# Patient Record
Sex: MALE | Race: BLACK OR AFRICAN AMERICAN | Employment: OTHER | URBAN - METROPOLITAN AREA
[De-identification: names, ages, dates, MRNs, and addresses within clinical notes are randomized per-mention and may not be internally consistent; named-entity substitution may affect disease eponyms.]

---

## 2017-01-05 ENCOUNTER — PATIENT MESSAGE (OUTPATIENT)
Dept: CARDIOLOGY CLINIC | Age: 72
End: 2017-01-05

## 2017-01-05 RX ORDER — METOPROLOL TARTRATE 50 MG/1
50 TABLET, FILM COATED ORAL 2 TIMES DAILY
Qty: 180 TABLET | Refills: 2 | Status: SHIPPED | OUTPATIENT
Start: 2017-01-05 | End: 2018-03-30 | Stop reason: SDUPTHER

## 2017-01-05 RX ORDER — FUROSEMIDE 40 MG/1
20 TABLET ORAL 2 TIMES DAILY
Qty: 90 TABLET | Refills: 3 | Status: SHIPPED | OUTPATIENT
Start: 2017-01-05 | End: 2018-03-30 | Stop reason: SDUPTHER

## 2017-03-02 RX ORDER — AMLODIPINE BESYLATE 5 MG/1
5 TABLET ORAL DAILY
Qty: 90 TABLET | Refills: 3 | Status: SHIPPED | OUTPATIENT
Start: 2017-03-02 | End: 2018-02-22 | Stop reason: SDUPTHER

## 2017-04-10 ENCOUNTER — OFFICE VISIT (OUTPATIENT)
Dept: CARDIOLOGY CLINIC | Age: 72
End: 2017-04-10

## 2017-04-10 VITALS
SYSTOLIC BLOOD PRESSURE: 150 MMHG | WEIGHT: 185 LBS | HEIGHT: 69 IN | OXYGEN SATURATION: 98 % | HEART RATE: 60 BPM | DIASTOLIC BLOOD PRESSURE: 94 MMHG | BODY MASS INDEX: 27.4 KG/M2

## 2017-04-10 DIAGNOSIS — I10 ESSENTIAL HYPERTENSION, MALIGNANT: Chronic | ICD-10-CM

## 2017-04-10 DIAGNOSIS — I50.32 CHRONIC DIASTOLIC HEART FAILURE (HCC): Primary | Chronic | ICD-10-CM

## 2017-04-10 DIAGNOSIS — I51.7 LVH (LEFT VENTRICULAR HYPERTROPHY): Chronic | ICD-10-CM

## 2017-04-10 DIAGNOSIS — Z94.0 KIDNEY TRANSPLANT RECIPIENT: ICD-10-CM

## 2017-04-10 PROCEDURE — 1036F TOBACCO NON-USER: CPT | Performed by: INTERNAL MEDICINE

## 2017-04-10 PROCEDURE — 1123F ACP DISCUSS/DSCN MKR DOCD: CPT | Performed by: INTERNAL MEDICINE

## 2017-04-10 PROCEDURE — G8598 ASA/ANTIPLAT THER USED: HCPCS | Performed by: INTERNAL MEDICINE

## 2017-04-10 PROCEDURE — 4040F PNEUMOC VAC/ADMIN/RCVD: CPT | Performed by: INTERNAL MEDICINE

## 2017-04-10 PROCEDURE — G8427 DOCREV CUR MEDS BY ELIG CLIN: HCPCS | Performed by: INTERNAL MEDICINE

## 2017-04-10 PROCEDURE — G8420 CALC BMI NORM PARAMETERS: HCPCS | Performed by: INTERNAL MEDICINE

## 2017-04-10 PROCEDURE — 99214 OFFICE O/P EST MOD 30 MIN: CPT | Performed by: INTERNAL MEDICINE

## 2017-04-10 PROCEDURE — 3017F COLORECTAL CA SCREEN DOC REV: CPT | Performed by: INTERNAL MEDICINE

## 2017-04-10 RX ORDER — ATORVASTATIN CALCIUM 20 MG/1
20 TABLET, FILM COATED ORAL DAILY
Qty: 90 TABLET | Refills: 3 | Status: SHIPPED | OUTPATIENT
Start: 2017-04-10 | End: 2018-02-22 | Stop reason: SDUPTHER

## 2017-04-10 RX ORDER — ATORVASTATIN CALCIUM 20 MG/1
20 TABLET, FILM COATED ORAL DAILY
Qty: 90 TABLET | Refills: 3 | Status: SHIPPED | OUTPATIENT
Start: 2017-04-10 | End: 2017-04-10 | Stop reason: SDUPTHER

## 2017-10-10 ENCOUNTER — HOSPITAL ENCOUNTER (OUTPATIENT)
Dept: OTHER | Age: 72
Discharge: HOME | End: 2017-10-10
Attending: INTERNAL MEDICINE | Admitting: INTERNAL MEDICINE

## 2017-10-10 DIAGNOSIS — I50.32 CHRONIC DIASTOLIC HEART FAILURE (HCC): Chronic | ICD-10-CM

## 2017-10-10 DIAGNOSIS — Z94.0 KIDNEY TRANSPLANT RECIPIENT: ICD-10-CM

## 2017-10-10 DIAGNOSIS — I10 ESSENTIAL HYPERTENSION, MALIGNANT: Chronic | ICD-10-CM

## 2017-10-10 DIAGNOSIS — I51.7 LVH (LEFT VENTRICULAR HYPERTROPHY): Chronic | ICD-10-CM

## 2017-10-10 LAB
A/G RATIO: 1.7 (ref 1.1–2.2)
ALBUMIN SERPL-MCNC: 4.3 G/DL (ref 3.4–5)
ALP BLD-CCNC: 150 U/L (ref 40–129)
ALT SERPL-CCNC: 14 U/L (ref 10–40)
ANION GAP SERPL CALCULATED.3IONS-SCNC: 14 MMOL/L (ref 3–16)
AST SERPL-CCNC: 22 U/L (ref 15–37)
BILIRUB SERPL-MCNC: 0.6 MG/DL (ref 0–1)
BUN BLDV-MCNC: 16 MG/DL (ref 7–20)
CALCIUM SERPL-MCNC: 9.3 MG/DL (ref 8.3–10.6)
CHLORIDE BLD-SCNC: 103 MMOL/L (ref 99–110)
CHOLESTEROL, TOTAL: 122 MG/DL (ref 0–199)
CO2: 27 MMOL/L (ref 21–32)
CREAT SERPL-MCNC: 1.4 MG/DL (ref 0.8–1.3)
GFR AFRICAN AMERICAN: >60
GFR NON-AFRICAN AMERICAN: 50
GLOBULIN: 2.5 G/DL
GLUCOSE BLD-MCNC: 102 MG/DL (ref 70–99)
HDLC SERPL-MCNC: 36 MG/DL (ref 40–60)
LDL CHOLESTEROL CALCULATED: 68 MG/DL
POTASSIUM SERPL-SCNC: 4 MMOL/L (ref 3.5–5.1)
SODIUM BLD-SCNC: 144 MMOL/L (ref 136–145)
TOTAL CK: 148 U/L (ref 39–308)
TOTAL PROTEIN: 6.8 G/DL (ref 6.4–8.2)
TRIGL SERPL-MCNC: 91 MG/DL (ref 0–150)
VLDLC SERPL CALC-MCNC: 18 MG/DL

## 2017-10-12 ENCOUNTER — HOSPITAL ENCOUNTER (OUTPATIENT)
Dept: NON INVASIVE DIAGNOSTICS | Age: 72
Discharge: HOME | End: 2017-10-12
Attending: INTERNAL MEDICINE | Admitting: INTERNAL MEDICINE

## 2017-10-12 ENCOUNTER — OFFICE VISIT (OUTPATIENT)
Dept: CARDIOLOGY CLINIC | Age: 72
End: 2017-10-12

## 2017-10-12 VITALS
SYSTOLIC BLOOD PRESSURE: 130 MMHG | DIASTOLIC BLOOD PRESSURE: 72 MMHG | HEIGHT: 69 IN | HEART RATE: 60 BPM | BODY MASS INDEX: 27.4 KG/M2 | OXYGEN SATURATION: 96 % | WEIGHT: 185 LBS

## 2017-10-12 DIAGNOSIS — I10 ESSENTIAL HYPERTENSION, MALIGNANT: Chronic | ICD-10-CM

## 2017-10-12 DIAGNOSIS — I51.7 LVH (LEFT VENTRICULAR HYPERTROPHY): Chronic | ICD-10-CM

## 2017-10-12 DIAGNOSIS — E78.5 HYPERLIPIDEMIA, UNSPECIFIED HYPERLIPIDEMIA TYPE: Chronic | ICD-10-CM

## 2017-10-12 DIAGNOSIS — I50.32 CHRONIC DIASTOLIC HEART FAILURE (HCC): Primary | Chronic | ICD-10-CM

## 2017-10-12 DIAGNOSIS — Z94.0 KIDNEY TRANSPLANT RECIPIENT: ICD-10-CM

## 2017-10-12 DIAGNOSIS — Z94.0 HISTORY OF KIDNEY TRANSPLANT: ICD-10-CM

## 2017-10-12 LAB
LV EF: 65 %
LVEF MODALITY: NORMAL

## 2017-10-12 PROCEDURE — 3017F COLORECTAL CA SCREEN DOC REV: CPT | Performed by: INTERNAL MEDICINE

## 2017-10-12 PROCEDURE — 99213 OFFICE O/P EST LOW 20 MIN: CPT | Performed by: INTERNAL MEDICINE

## 2017-10-12 PROCEDURE — 1123F ACP DISCUSS/DSCN MKR DOCD: CPT | Performed by: INTERNAL MEDICINE

## 2017-10-12 PROCEDURE — G8484 FLU IMMUNIZE NO ADMIN: HCPCS | Performed by: INTERNAL MEDICINE

## 2017-10-12 PROCEDURE — 1036F TOBACCO NON-USER: CPT | Performed by: INTERNAL MEDICINE

## 2017-10-12 PROCEDURE — G8598 ASA/ANTIPLAT THER USED: HCPCS | Performed by: INTERNAL MEDICINE

## 2017-10-12 PROCEDURE — G8427 DOCREV CUR MEDS BY ELIG CLIN: HCPCS | Performed by: INTERNAL MEDICINE

## 2017-10-12 PROCEDURE — 4040F PNEUMOC VAC/ADMIN/RCVD: CPT | Performed by: INTERNAL MEDICINE

## 2017-10-12 PROCEDURE — G8419 CALC BMI OUT NRM PARAM NOF/U: HCPCS | Performed by: INTERNAL MEDICINE

## 2017-10-12 NOTE — PROGRESS NOTES
[facility]   Cardiac Follow up      Pearl Magana  YOB: 1945    Date of Visit:  10/12/17      Chief Complaint   Patient presents with    Congestive Heart Failure        History of Present Illness:  Mr. Pearl Magana is a 71 y.o. gentleman here for follow up visit. He has a history of his carotid disease, CKD, CHF, hyperlipidemia, and hypertension. History of cardiac catheterization at [facility] in 2007 with normal coronaries. Shan Bartlett underwent a kidney transplant in December 2014 up at [facility]. He maintains follow up in [facility] every six months. He has been doing well over the past year since last seen by me. He had labs drawn in [facility] recently. He had labs done and his cholesterol is up, he took statins in the past and high doses causes muscle aches and pain but is agreeable to restarting low dose. We discussed all other labs that looks good. No sob or chest discomfort. He did get diagnosed with prostate cancer. He is followed by radiation doctor at [facility] they are monitoring it no treatment at this time. His BP has been better since transplantation and he has been able to reduce and stop some BP medications. He is has had no change in medications. He denies exertional chest pain, SOB/CARUSO, PND, palpitations, light-headedness, or edema. He continues to do great since transplant. No problems with rejection of his kidney. Kidney function is good. Overall he feels well. Recent testing includes:  5/18/12 Carotids: 16-49% bilateral  5/17/12 Lower extremity arterial doppler US- normal  5/18/12 ECHO- moderate LVH, Ef 56%, diastolic noncompliance. 3/2012 Lexiscan- EF 65%, perfusion normal.       Testing at [facility] included :  Carotid ultrasound - SUNNI 17-35%, LICA 28-61%.     Echo - mild septal LVH, EF 14%, stage 1 diastolic dysfunction, severe LAE, dilated aorta with mid ascending aorta at 4.0 cm, chordal JASMIN at rest with trivial MR and peak LVOT gradient of
and coordination  · No abnormalities of mood, affect, memory, mentation, or behavior are noted    Echo 9/27/2016  Normal EF 55%  Concentric LVH  Mild MR  Trivial AI  RVSP 38 mm Hg. Assessment/Plan:    1. LVH (left ventricular hypertrophy): moderate by ECHO 2008, 2011, and 2012. Echo 8/2013 shows moderate cLVH. 2. Essential hypertension:--148/86, 68    Stable today. 3. Chronic diastolic heart failure:  He had a normal RHC 2003. 5/2012 ECHO- moderate LVH, normal EF, and diastolic noncompliance. Echo shows similar findings with EF 60%, moderate CLVH, diastolic noncompliance. Appears compensated on exam.  9/27/2016 EF 55%   4. Carotid artery disease - SUNNI 34-36%, LICA 44-54% from November of 2011.  5/18/12 Carotids: 16-49% bilateral.   5. Chronic kidney disease: s/p kidney transplant on 12/20/14 at [facility].       6. Hyperlipidemia:  7/12/2106       HDL 36   Not on statins but not opposed to restarting it. He is intolerable of Zocor greater than  40 mg it gives him muscle aches and pain. Plan:  1. Oswald Goetz is stable from a CV standpoint  2. No changes today. 3.   Follow up in 1 year. I appreciate the opportunity of cooperating in the care of this patient.     Paulo Burgos M.D., [facility] [place]

## 2018-02-22 DIAGNOSIS — Z94.0 KIDNEY TRANSPLANT RECIPIENT: ICD-10-CM

## 2018-02-22 DIAGNOSIS — I51.7 LVH (LEFT VENTRICULAR HYPERTROPHY): Chronic | ICD-10-CM

## 2018-02-22 DIAGNOSIS — I10 ESSENTIAL HYPERTENSION, MALIGNANT: Chronic | ICD-10-CM

## 2018-02-22 DIAGNOSIS — I50.32 CHRONIC DIASTOLIC HEART FAILURE (HCC): Chronic | ICD-10-CM

## 2018-02-22 RX ORDER — AMLODIPINE BESYLATE 5 MG/1
5 TABLET ORAL DAILY
Qty: 90 TABLET | Refills: 3 | Status: SHIPPED | OUTPATIENT
Start: 2018-02-22 | End: 2018-10-05 | Stop reason: SDUPTHER

## 2018-02-22 RX ORDER — ATORVASTATIN CALCIUM 20 MG/1
20 TABLET, FILM COATED ORAL DAILY
Qty: 90 TABLET | Refills: 3 | Status: SHIPPED | OUTPATIENT
Start: 2018-02-22 | End: 2018-10-05 | Stop reason: SDUPTHER

## 2018-03-30 RX ORDER — METOPROLOL TARTRATE 50 MG/1
50 TABLET, FILM COATED ORAL 2 TIMES DAILY
Qty: 180 TABLET | Refills: 3 | Status: SHIPPED | OUTPATIENT
Start: 2018-03-30 | End: 2018-10-05 | Stop reason: SDUPTHER

## 2018-03-30 RX ORDER — FUROSEMIDE 40 MG/1
20 TABLET ORAL 2 TIMES DAILY
Qty: 90 TABLET | Refills: 3 | Status: SHIPPED | OUTPATIENT
Start: 2018-03-30 | End: 2018-10-05 | Stop reason: SDUPTHER

## 2018-10-01 ENCOUNTER — TELEPHONE (OUTPATIENT)
Dept: CARDIOLOGY CLINIC | Age: 73
End: 2018-10-01

## 2018-10-01 DIAGNOSIS — I50.32 CHRONIC DIASTOLIC HEART FAILURE (HCC): Primary | Chronic | ICD-10-CM

## 2018-10-01 DIAGNOSIS — E78.5 HYPERLIPIDEMIA, UNSPECIFIED HYPERLIPIDEMIA TYPE: ICD-10-CM

## 2018-10-01 DIAGNOSIS — I10 ESSENTIAL HYPERTENSION, MALIGNANT: Chronic | ICD-10-CM

## 2018-10-01 DIAGNOSIS — Z94.0 KIDNEY TRANSPLANT RECIPIENT: ICD-10-CM

## 2018-10-02 ENCOUNTER — HOSPITAL ENCOUNTER (OUTPATIENT)
Age: 73
Discharge: HOME | End: 2018-10-02
Payer: MEDICARE

## 2018-10-02 DIAGNOSIS — E78.5 HYPERLIPIDEMIA, UNSPECIFIED HYPERLIPIDEMIA TYPE: ICD-10-CM

## 2018-10-02 DIAGNOSIS — I10 ESSENTIAL HYPERTENSION, MALIGNANT: Chronic | ICD-10-CM

## 2018-10-02 DIAGNOSIS — I50.32 CHRONIC DIASTOLIC HEART FAILURE (HCC): Chronic | ICD-10-CM

## 2018-10-02 DIAGNOSIS — Z94.0 KIDNEY TRANSPLANT RECIPIENT: ICD-10-CM

## 2018-10-02 LAB
A/G RATIO: 1.9 (ref 1.1–2.2)
ALBUMIN SERPL-MCNC: 4.5 G/DL (ref 3.4–5)
ALP BLD-CCNC: 133 U/L (ref 40–129)
ALT SERPL-CCNC: 13 U/L (ref 10–40)
ANION GAP SERPL CALCULATED.3IONS-SCNC: 14 MMOL/L (ref 3–16)
AST SERPL-CCNC: 23 U/L (ref 15–37)
BILIRUB SERPL-MCNC: 0.6 MG/DL (ref 0–1)
BUN BLDV-MCNC: 24 MG/DL (ref 7–20)
CALCIUM SERPL-MCNC: 9.4 MG/DL (ref 8.3–10.6)
CHLORIDE BLD-SCNC: 104 MMOL/L (ref 99–110)
CHOLESTEROL, TOTAL: 124 MG/DL (ref 0–199)
CO2: 26 MMOL/L (ref 21–32)
CREAT SERPL-MCNC: 1.9 MG/DL (ref 0.8–1.3)
GFR AFRICAN AMERICAN: 42
GFR NON-AFRICAN AMERICAN: 35
GLOBULIN: 2.4 G/DL
GLUCOSE BLD-MCNC: 107 MG/DL (ref 70–99)
HDLC SERPL-MCNC: 33 MG/DL (ref 40–60)
LDL CHOLESTEROL CALCULATED: 73 MG/DL
POTASSIUM SERPL-SCNC: 3.6 MMOL/L (ref 3.5–5.1)
SODIUM BLD-SCNC: 144 MMOL/L (ref 136–145)
TOTAL CK: 195 U/L (ref 39–308)
TOTAL PROTEIN: 6.9 G/DL (ref 6.4–8.2)
TRIGL SERPL-MCNC: 92 MG/DL (ref 0–150)
VLDLC SERPL CALC-MCNC: 18 MG/DL

## 2018-10-02 PROCEDURE — 82550 ASSAY OF CK (CPK): CPT

## 2018-10-02 PROCEDURE — 80061 LIPID PANEL: CPT

## 2018-10-02 PROCEDURE — 80053 COMPREHEN METABOLIC PANEL: CPT

## 2018-10-02 PROCEDURE — 36415 COLL VENOUS BLD VENIPUNCTURE: CPT

## 2018-10-05 ENCOUNTER — OFFICE VISIT (OUTPATIENT)
Dept: CARDIOLOGY CLINIC | Age: 73
End: 2018-10-05
Payer: MEDICARE

## 2018-10-05 VITALS
WEIGHT: 189.12 LBS | OXYGEN SATURATION: 98 % | BODY MASS INDEX: 28.01 KG/M2 | HEART RATE: 65 BPM | HEIGHT: 69 IN | SYSTOLIC BLOOD PRESSURE: 134 MMHG | DIASTOLIC BLOOD PRESSURE: 76 MMHG | RESPIRATION RATE: 16 BRPM

## 2018-10-05 DIAGNOSIS — I50.32 CHRONIC DIASTOLIC HEART FAILURE (HCC): Primary | Chronic | ICD-10-CM

## 2018-10-05 DIAGNOSIS — I51.7 LVH (LEFT VENTRICULAR HYPERTROPHY): Chronic | ICD-10-CM

## 2018-10-05 DIAGNOSIS — I10 ESSENTIAL HYPERTENSION, MALIGNANT: Chronic | ICD-10-CM

## 2018-10-05 DIAGNOSIS — Z94.0 KIDNEY TRANSPLANT RECIPIENT: ICD-10-CM

## 2018-10-05 PROCEDURE — 3017F COLORECTAL CA SCREEN DOC REV: CPT | Performed by: INTERNAL MEDICINE

## 2018-10-05 PROCEDURE — 1123F ACP DISCUSS/DSCN MKR DOCD: CPT | Performed by: INTERNAL MEDICINE

## 2018-10-05 PROCEDURE — G8419 CALC BMI OUT NRM PARAM NOF/U: HCPCS | Performed by: INTERNAL MEDICINE

## 2018-10-05 PROCEDURE — G8598 ASA/ANTIPLAT THER USED: HCPCS | Performed by: INTERNAL MEDICINE

## 2018-10-05 PROCEDURE — 1101F PT FALLS ASSESS-DOCD LE1/YR: CPT | Performed by: INTERNAL MEDICINE

## 2018-10-05 PROCEDURE — G8484 FLU IMMUNIZE NO ADMIN: HCPCS | Performed by: INTERNAL MEDICINE

## 2018-10-05 PROCEDURE — 99214 OFFICE O/P EST MOD 30 MIN: CPT | Performed by: INTERNAL MEDICINE

## 2018-10-05 PROCEDURE — 4040F PNEUMOC VAC/ADMIN/RCVD: CPT | Performed by: INTERNAL MEDICINE

## 2018-10-05 PROCEDURE — G8427 DOCREV CUR MEDS BY ELIG CLIN: HCPCS | Performed by: INTERNAL MEDICINE

## 2018-10-05 PROCEDURE — 1036F TOBACCO NON-USER: CPT | Performed by: INTERNAL MEDICINE

## 2018-10-05 NOTE — PROGRESS NOTES
[address]   Advanced Heart Failure/Pulmonary Hypertension  Cardiac Evaluation      Kei Deleon  YOB: 1945    Date of Visit:  10/5/18  Chief Complaint   Patient presents with    1 Year Follow Up     Denies chest pain, dizziness, sob or fatigue    Congestive Heart Failure    Hypertension    Hyperlipidemia    Edema     left ankle/leg as the day goes on        History of Present Illness:  Mr. Kei Deleon is a 68 y.o. gentleman here for follow up visit. He has a history of his carotid disease, CKD, CHF, hyperlipidemia, and hypertension. History of cardiac catheterization at [facility] in 2007 with normal coronaries. He underwent a kidney transplant in December 2014 up at [facility]. He maintains follow up in [facility] every six months. He has been doing well over the past year since last seen by me. He had labs drawn in [facility] recently. Today, he reports that since seen he had a biopsy and was diagnosed with prostate cancer. He is followed by radiation doctor at [facility] they are monitoring. no treatment at this time. He continues to do great since transplant. Kidney function is good. He is has had no change in medications. We reviewed all recent labs including his chol and they look good. Overall, he feels well. He denies exertional chest pain, SOB/CARUSO, PND, palpitations, light-headedness, or edema. He states that he has been doing well. No change in medications. Recent testing includes:  5/18/12 Carotids: 16-49% bilateral  5/17/12 Lower extremity arterial doppler US- normal  5/18/12 ECHO- moderate LVH, Ef 80%, diastolic noncompliance. 3/2012 Lexiscan- EF 65%, perfusion normal.       Testing at [facility] included :  Carotid ultrasound - SUNNI 98-84%, LICA 93-96%. Echo - mild septal LVH, EF 36%, stage 1 diastolic dysfunction, severe LAE, dilated aorta with mid ascending aorta at 4.0 cm, chordal JASMIN at rest with trivial MR and peak LVOT gradient of 11 mmHg.   Nitrite the LVOT

## 2018-10-06 RX ORDER — METOPROLOL TARTRATE 50 MG/1
50 TABLET, FILM COATED ORAL 2 TIMES DAILY
Qty: 180 TABLET | Refills: 3 | Status: SHIPPED | OUTPATIENT
Start: 2018-10-06 | End: 2019-10-02 | Stop reason: SDUPTHER

## 2018-10-06 RX ORDER — ATORVASTATIN CALCIUM 20 MG/1
20 TABLET, FILM COATED ORAL DAILY
Qty: 90 TABLET | Refills: 3 | Status: SHIPPED | OUTPATIENT
Start: 2018-10-06 | End: 2020-01-02 | Stop reason: SDUPTHER

## 2018-10-06 RX ORDER — FUROSEMIDE 40 MG/1
20 TABLET ORAL 2 TIMES DAILY
Qty: 90 TABLET | Refills: 3 | Status: SHIPPED | OUTPATIENT
Start: 2018-10-06 | End: 2019-10-02 | Stop reason: SDUPTHER

## 2018-10-06 RX ORDER — AMLODIPINE BESYLATE 5 MG/1
5 TABLET ORAL DAILY
Qty: 90 TABLET | Refills: 3 | Status: SHIPPED | OUTPATIENT
Start: 2018-10-06 | End: 2019-11-14 | Stop reason: SDUPTHER

## 2018-10-15 NOTE — COMMUNICATION BODY
[facility]   Advanced Heart Failure/Pulmonary Hypertension  Cardiac Evaluation      Manuel Pettit  YOB: 1945    Date of Visit:  10/5/18  Chief Complaint   Patient presents with    1 Year Follow Up     Denies chest pain, dizziness, sob or fatigue    Congestive Heart Failure    Hypertension    Hyperlipidemia    Edema     left ankle/leg as the day goes on        History of Present Illness:  Mr. Manuel Pettit is a 68 y.o. gentleman here for follow up visit. He has a history of his carotid disease, CKD, CHF, hyperlipidemia, and hypertension. History of cardiac catheterization at [facility] in 2007 with normal coronaries. He underwent a kidney transplant in December 2014 up at [facility]. He maintains follow up in [place] every six months. He has been doing well over the past year since last seen by me. He had labs drawn in [place] recently. Today, he reports that since seen he had a biopsy and was diagnosed with prostate cancer. He is followed by radiation doctor at [facility] they are monitoring. no treatment at this time. He continues to do great since transplant. Kidney function is good. He is has had no change in medications. We reviewed all recent labs including his chol and they look good. Overall, he feels well. He denies exertional chest pain, SOB/CARUSO, PND, palpitations, light-headedness, or edema. He states that he has been doing well. No change in medications. Recent testing includes:  5/18/12 Carotids: 16-49% bilateral  5/17/12 Lower extremity arterial doppler US- normal  5/18/12 ECHO- moderate LVH, Ef 24%, diastolic noncompliance. 3/2012 Lexiscan- EF 65%, perfusion normal.       Testing at [facility] included :  Carotid ultrasound - SUNNI 11-78%, LICA 47-22%. Echo - mild septal LVH, EF 69%, stage 1 diastolic dysfunction, severe LAE, dilated aorta with mid ascending aorta at 4.0 cm, chordal JASMIN at rest with trivial MR and peak LVOT gradient of 11 mmHg.   Nitrite the LVOT

## 2019-10-01 DIAGNOSIS — I51.7 LVH (LEFT VENTRICULAR HYPERTROPHY): Chronic | ICD-10-CM

## 2019-10-01 DIAGNOSIS — Z94.0 KIDNEY TRANSPLANT RECIPIENT: ICD-10-CM

## 2019-10-01 DIAGNOSIS — I10 ESSENTIAL HYPERTENSION, MALIGNANT: Chronic | ICD-10-CM

## 2019-10-01 DIAGNOSIS — I50.32 CHRONIC DIASTOLIC HEART FAILURE (HCC): Chronic | ICD-10-CM

## 2019-10-02 ENCOUNTER — OFFICE VISIT (OUTPATIENT)
Dept: CARDIOLOGY CLINIC | Age: 74
End: 2019-10-02
Payer: MEDICARE

## 2019-10-02 VITALS
WEIGHT: 191 LBS | DIASTOLIC BLOOD PRESSURE: 74 MMHG | OXYGEN SATURATION: 97 % | SYSTOLIC BLOOD PRESSURE: 120 MMHG | HEIGHT: 69 IN | BODY MASS INDEX: 28.29 KG/M2 | HEART RATE: 60 BPM

## 2019-10-02 DIAGNOSIS — I51.7 LVH (LEFT VENTRICULAR HYPERTROPHY): Chronic | ICD-10-CM

## 2019-10-02 DIAGNOSIS — Z94.0 KIDNEY TRANSPLANT RECIPIENT: ICD-10-CM

## 2019-10-02 DIAGNOSIS — I50.32 CHRONIC DIASTOLIC HEART FAILURE (HCC): Primary | Chronic | ICD-10-CM

## 2019-10-02 DIAGNOSIS — I10 ESSENTIAL HYPERTENSION, MALIGNANT: Chronic | ICD-10-CM

## 2019-10-02 PROCEDURE — 1123F ACP DISCUSS/DSCN MKR DOCD: CPT | Performed by: INTERNAL MEDICINE

## 2019-10-02 PROCEDURE — 99214 OFFICE O/P EST MOD 30 MIN: CPT | Performed by: INTERNAL MEDICINE

## 2019-10-02 PROCEDURE — G8598 ASA/ANTIPLAT THER USED: HCPCS | Performed by: INTERNAL MEDICINE

## 2019-10-02 PROCEDURE — 4040F PNEUMOC VAC/ADMIN/RCVD: CPT | Performed by: INTERNAL MEDICINE

## 2019-10-02 PROCEDURE — 1036F TOBACCO NON-USER: CPT | Performed by: INTERNAL MEDICINE

## 2019-10-02 PROCEDURE — G8484 FLU IMMUNIZE NO ADMIN: HCPCS | Performed by: INTERNAL MEDICINE

## 2019-10-02 PROCEDURE — G8419 CALC BMI OUT NRM PARAM NOF/U: HCPCS | Performed by: INTERNAL MEDICINE

## 2019-10-02 PROCEDURE — G8427 DOCREV CUR MEDS BY ELIG CLIN: HCPCS | Performed by: INTERNAL MEDICINE

## 2019-10-02 PROCEDURE — 3017F COLORECTAL CA SCREEN DOC REV: CPT | Performed by: INTERNAL MEDICINE

## 2019-10-02 RX ORDER — METOPROLOL TARTRATE 50 MG/1
50 TABLET, FILM COATED ORAL 2 TIMES DAILY
Qty: 180 TABLET | Refills: 3 | Status: SHIPPED | OUTPATIENT
Start: 2019-10-02 | End: 2019-12-20 | Stop reason: SDUPTHER

## 2019-10-02 RX ORDER — FUROSEMIDE 40 MG/1
20 TABLET ORAL 2 TIMES DAILY
Qty: 90 TABLET | Refills: 3 | Status: SHIPPED | OUTPATIENT
Start: 2019-10-02

## 2019-10-02 RX ORDER — NITROGLYCERIN 0.4 MG/1
0.4 TABLET SUBLINGUAL PRN
Qty: 25 TABLET | Refills: 3 | Status: SHIPPED | OUTPATIENT
Start: 2019-10-02 | End: 2022-04-06 | Stop reason: SDUPTHER

## 2019-10-02 RX ORDER — METOPROLOL TARTRATE 50 MG/1
TABLET, FILM COATED ORAL
Qty: 180 TABLET | Refills: 3 | Status: SHIPPED | OUTPATIENT
Start: 2019-10-02 | End: 2020-01-02 | Stop reason: SDUPTHER

## 2019-11-14 DIAGNOSIS — I51.7 LVH (LEFT VENTRICULAR HYPERTROPHY): Chronic | ICD-10-CM

## 2019-11-14 DIAGNOSIS — I50.32 CHRONIC DIASTOLIC HEART FAILURE (HCC): Chronic | ICD-10-CM

## 2019-11-14 DIAGNOSIS — I10 ESSENTIAL HYPERTENSION, MALIGNANT: Chronic | ICD-10-CM

## 2019-11-14 DIAGNOSIS — Z94.0 KIDNEY TRANSPLANT RECIPIENT: ICD-10-CM

## 2019-11-15 RX ORDER — AMLODIPINE BESYLATE 5 MG/1
TABLET ORAL
Qty: 90 TABLET | Refills: 3 | Status: SHIPPED | OUTPATIENT
Start: 2019-11-15 | End: 2022-06-24 | Stop reason: SDUPTHER

## 2019-12-20 ENCOUNTER — OFFICE VISIT (OUTPATIENT)
Dept: CARDIOLOGY CLINIC | Age: 74
End: 2019-12-20
Payer: MEDICARE

## 2019-12-20 ENCOUNTER — TELEPHONE (OUTPATIENT)
Dept: CARDIOLOGY CLINIC | Age: 74
End: 2019-12-20

## 2019-12-20 VITALS
WEIGHT: 192.8 LBS | DIASTOLIC BLOOD PRESSURE: 76 MMHG | HEIGHT: 69 IN | OXYGEN SATURATION: 96 % | HEART RATE: 71 BPM | SYSTOLIC BLOOD PRESSURE: 128 MMHG | BODY MASS INDEX: 28.56 KG/M2

## 2019-12-20 DIAGNOSIS — R00.2 PALPITATIONS: ICD-10-CM

## 2019-12-20 DIAGNOSIS — Z94.0 KIDNEY TRANSPLANT RECIPIENT: ICD-10-CM

## 2019-12-20 DIAGNOSIS — R00.2 PALPITATIONS: Primary | ICD-10-CM

## 2019-12-20 DIAGNOSIS — I50.32 CHRONIC DIASTOLIC HEART FAILURE (HCC): Primary | ICD-10-CM

## 2019-12-20 DIAGNOSIS — I10 ESSENTIAL HYPERTENSION: ICD-10-CM

## 2019-12-20 PROCEDURE — 3017F COLORECTAL CA SCREEN DOC REV: CPT | Performed by: CLINICAL NURSE SPECIALIST

## 2019-12-20 PROCEDURE — G8484 FLU IMMUNIZE NO ADMIN: HCPCS | Performed by: CLINICAL NURSE SPECIALIST

## 2019-12-20 PROCEDURE — G8417 CALC BMI ABV UP PARAM F/U: HCPCS | Performed by: CLINICAL NURSE SPECIALIST

## 2019-12-20 PROCEDURE — 1036F TOBACCO NON-USER: CPT | Performed by: CLINICAL NURSE SPECIALIST

## 2019-12-20 PROCEDURE — 93000 ELECTROCARDIOGRAM COMPLETE: CPT | Performed by: INTERNAL MEDICINE

## 2019-12-20 PROCEDURE — G8598 ASA/ANTIPLAT THER USED: HCPCS | Performed by: CLINICAL NURSE SPECIALIST

## 2019-12-20 PROCEDURE — 99214 OFFICE O/P EST MOD 30 MIN: CPT | Performed by: CLINICAL NURSE SPECIALIST

## 2019-12-20 PROCEDURE — 4040F PNEUMOC VAC/ADMIN/RCVD: CPT | Performed by: CLINICAL NURSE SPECIALIST

## 2019-12-20 PROCEDURE — 1123F ACP DISCUSS/DSCN MKR DOCD: CPT | Performed by: CLINICAL NURSE SPECIALIST

## 2019-12-20 PROCEDURE — G8427 DOCREV CUR MEDS BY ELIG CLIN: HCPCS | Performed by: CLINICAL NURSE SPECIALIST

## 2019-12-20 PROCEDURE — 0296T PR EXT ECG > 48HR TO 21 DAY RCRD W/CONECT INTL RCRD: CPT | Performed by: INTERNAL MEDICINE

## 2019-12-20 RX ORDER — PREDNISONE 10 MG/1
10 TABLET ORAL SEE ADMIN INSTRUCTIONS
COMMUNITY
Start: 2019-12-11 | End: 2019-12-23

## 2019-12-24 ENCOUNTER — TELEPHONE (OUTPATIENT)
Dept: CARDIOLOGY CLINIC | Age: 74
End: 2019-12-24

## 2020-01-02 RX ORDER — METOPROLOL TARTRATE 50 MG/1
50 TABLET, FILM COATED ORAL 2 TIMES DAILY
Qty: 180 TABLET | Refills: 3 | Status: SHIPPED | OUTPATIENT
Start: 2020-01-02 | End: 2020-01-07 | Stop reason: SDUPTHER

## 2020-01-02 RX ORDER — ATORVASTATIN CALCIUM 20 MG/1
20 TABLET, FILM COATED ORAL DAILY
Qty: 90 TABLET | Refills: 3 | Status: SHIPPED | OUTPATIENT
Start: 2020-01-02 | End: 2020-01-07 | Stop reason: SDUPTHER

## 2020-01-07 ENCOUNTER — TELEPHONE (OUTPATIENT)
Dept: CARDIOLOGY CLINIC | Age: 75
End: 2020-01-07

## 2020-01-07 PROCEDURE — 0298T PR EXT ECG > 48HR TO 21 DAY REVIEW AND INTERPRETATN: CPT | Performed by: INTERNAL MEDICINE

## 2020-01-07 RX ORDER — METOPROLOL TARTRATE 50 MG/1
50 TABLET, FILM COATED ORAL 2 TIMES DAILY
Qty: 180 TABLET | Refills: 3 | Status: SHIPPED | OUTPATIENT
Start: 2020-01-07 | End: 2021-01-11

## 2020-01-07 RX ORDER — ATORVASTATIN CALCIUM 20 MG/1
20 TABLET, FILM COATED ORAL DAILY
Qty: 90 TABLET | Refills: 3 | Status: SHIPPED | OUTPATIENT
Start: 2020-01-07

## 2020-07-09 ENCOUNTER — NURSE ONLY (OUTPATIENT)
Dept: PRIMARY CARE CLINIC | Age: 75
End: 2020-07-09
Payer: MEDICARE

## 2020-07-09 PROCEDURE — 99211 OFF/OP EST MAY X REQ PHY/QHP: CPT | Performed by: NURSE PRACTITIONER

## 2020-07-09 NOTE — PROGRESS NOTES
Edison Alvarez received a viral test for COVID-19. They were educated on isolation and quarantine as appropriate. For any symptoms, they were directed to seek care from their PCP, given contact information to establish with a doctor, directed to an urgent care or the emergency room.

## 2020-07-13 LAB
SARS-COV-2: NOT DETECTED
SOURCE: NORMAL

## 2020-07-28 NOTE — PROGRESS NOTES
[facility], [place], operated by [facility]   Advanced Heart Failure/Pulmonary Hypertension  Cardiac Evaluation      Olga Ballesteros  YOB: 1945    Date of Visit:  7/31/20  Chief Complaint   Patient presents with    Congestive Heart Failure      History of Present Illness:  Mr. Olga Ballesteros is a 69 y.o. gentleman with a history of his carotid disease, CKD, CHF, hyperlipidemia, and hypertension. History of cardiac catheterization at [facility] in 2007 with normal coronaries. He underwent a kidney transplant in December 2014 up at [address] [place] [address]; he maintains follow up in [place] every six months. On 10/5/18, he reported that he had a biopsy and was diagnosed with prostate cancer. He is followed by radiation doctor at [facility] they are monitoring. No treatment at this time. Today, he is here for an ECHO and follow up. Overall, he states he feels well. He denies exertional chest pain, CARUSO/PND, palpitations, light-headedness. Mild LE edema by the evening time. He is active with outdoor walks and treadmill, does not smoke. He had a painful bout of Shingles since last visit. Recent Covid test negative (no symptoms). He lost his brother Tanya's Day 2020 from lung disease. Allergies   Allergen Reactions    Everolimus Rash     Causes Thrush    Statins Other (See Comments)     Muscle degeneration     Current Outpatient Medications   Medication Sig Dispense Refill    atorvastatin (LIPITOR) 20 MG tablet Take 1 tablet by mouth daily 90 tablet 3    metoprolol tartrate (LOPRESSOR) 50 MG tablet Take 1 tablet by mouth 2 times daily 180 tablet 3    amLODIPine (NORVASC) 5 MG tablet TAKE 1 TABLET EVERY DAY 90 tablet 3    furosemide (LASIX) 40 MG tablet Take 0.5 tablets by mouth 2 times daily 90 tablet 3    nitroGLYCERIN (NITROSTAT) 0.4 MG SL tablet Place 1 tablet under the tongue as needed (Take for chest pain.   Up to 3 tablets 5 minutes apart for continued chest pain.) 25 tablet 3    omeprazole (PRILOSEC) 20 MG capsule Take 20 mg by  Food insecurity     Worry: Not on file     Inability: Not on file    Transportation needs     Medical: Not on file     Non-medical: Not on file   Tobacco Use    Smoking status: Never Smoker    Smokeless tobacco: Never Used   Substance and Sexual Activity    Alcohol use: No    Drug use: No    Sexual activity: Yes     Partners: Female     Comment:    Lifestyle    Physical activity     Days per week: Not on file     Minutes per session: Not on file    Stress: Not on file   Relationships    Social connections     Talks on phone: Not on file     Gets together: Not on file     Attends Hoahaoism service: Not on file     Active member of club or organization: Not on file     Attends meetings of clubs or organizations: Not on file     Relationship status: Not on file    Intimate partner violence     Fear of current or ex partner: Not on file     Emotionally abused: Not on file     Physically abused: Not on file     Forced sexual activity: Not on file   Other Topics Concern    Not on file   Social History Narrative    Not on file     Review of Systems:   · Constitutional: there has been no unanticipated weight loss. There's been no change in energy level, sleep pattern, or activity level. · Eyes: No visual changes or diplopia. No scleral icterus. · ENT: No Headaches, hearing loss or vertigo. No mouth sores or sore throat. · Cardiovascular: Reviewed in HPI    · Respiratory: No cough or wheezing, no sputum production. No hematemesis. · Gastrointestinal: No abdominal pain, appetite loss, blood in stools. No change in bowel or bladder habits. · Genitourinary: No dysuria, trouble voiding, or hematuria. · Musculoskeletal:  No gait disturbance, weakness or joint complaints. · Integumentary: No rash or pruritis. · Neurological: No headache, diplopia, change in muscle strength, numbness or tingling. No change in gait, balance, coordination, mood, affect, memory, mentation, behavior.   · Psychiatric: No anxiety, no depression. · Endocrine: No malaise, fatigue or temperature intolerance. No excessive thirst, fluid intake, or urination. No tremor. · Hematologic/Lymphatic: No abnormal bruising or bleeding, blood clots or swollen lymph nodes. · Allergic/Immunologic: No nasal congestion or hives. Physical Examination:    Vitals:    07/31/20 0841   BP: 122/66   Pulse: 62   SpO2: 97%   Weight: 193 lb (87.5 kg)   Height: 5' 9\" (1.753 m)     Body mass index is 28.5 kg/m². Wt Readings from Last 3 Encounters:   07/31/20 193 lb (87.5 kg)   12/20/19 192 lb 12.8 oz (87.5 kg)   10/02/19 191 lb (86.6 kg)     BP Readings from Last 3 Encounters:   07/31/20 122/66   12/20/19 128/76   10/02/19 120/74     Constitutional and General Appearance:   WD/WN in NAD  HEENT:  NC/AT  STANTON  Respiratory:  · Normal excursion and expansion without use of accessory muscles  · Resp Auscultation: Normal breath sounds without dullness  Cardiovascular:  · The apical impulses not displaced  · Heart tones are crisp and normal  · Cervical veins are not engorged  · The carotid upstroke is normal in amplitude and contour without delay or bruit  · JVP less than 8 cm H2O  RRR with nl S1 and S2 without m,r,g  · Peripheral pulses are symmetrical and full  · There is no clubbing, cyanosis of the extremities. · No edema  · Femoral Arteries: 2+ and equal  · Pedal Pulses: 2+ and equal   Abdomen:  · No masses or tenderness  · Liver/Spleen: No Abnormalities Noted  Neurological/Psychiatric:  · Alert and oriented in all spheres  · Moves all extremities well  · Exhibits normal gait balance and coordination  · No abnormalities of mood, affect, memory, mentation, or behavior are noted    Diagnostic Testing:    Echo 10/2017  Normal left ventricle size and systolic function with an estimated ejection fraction of 65%.  No regional wall motion abnormalities are seen.  Lovina Ramirez is concentric left ventricular hypertrophy.   Diastolic filling parameters suggests grade II diastolic dysfunction .  Dewey Moat is mild tricuspid regurgitation with RVSP estimated at 32 mmHg. The left atrium is enlarged.     Echo 9/27/2016  Normal EF 55%  Concentric LVH  Mild MR  Trivial AI  RVSP 38 mm Hg.  5/18/12 Carotids: 16-49% bilateral  5/17/12 Lower extremity arterial doppler US- normal  5/18/12 ECHO- moderate LVH, Ef 22%, diastolic noncompliance. 3/2012 Lexiscan- EF 65%, perfusion normal.     Testing at [facility] includes:  Carotid ultrasound - SUNIN 03-21%, LICA 30-71%. Echo - mild septal LVH, EF 59%, stage 1 diastolic dysfunction, severe LAE, dilated aorta with mid ascending aorta at 4.0 cm, chordal JASMIN at rest with trivial MR and peak LVOT gradient of 11 mmHg. Nitrite the LVOT gradient is 22 mmHg and no increase in JASMIN or MR. Assessment:  1. Chronic diastolic heart failure ([address]    2. Essential hypertension, malignant Accelerated    3. Kidney transplant recipient    4. LVH (left ventricular hypertrophy)    5. Essential hypertension        1. Chronic diastolic heart failure: Stable. Compensated by exam.  -ECHO today 7/31/20 prelim> Stable findings  -ECHO 48/20/42> Diastolic filling parameters suggests grade II diastolic dysfunction .  -Continue Lasix, Metoprolol    No Ace or ARB due to kidney transplant and increasing creatine. 2. Essential hypertension: Stable. Controlled on Amlodipine & Lopressor. /66   Pulse 62   Ht 5' 9\" (1.753 m)   Wt 193 lb (87.5 kg)   SpO2 97%   BMI 28.50 kg/m²        3. Kidney transplant recipient:  December 2014 at [facility].       4. LVH (left ventricular hypertrophy):  Noted on ECHO        Carotid artery disease: Stable  Dopplers 5825> SUNNI 44-49%, LICA 55-76% from November of 2011. Dopplers 5/18/12> 16-49% bilateral.    Abd aorta ultrasound [facility])> No evidence of abd aortic aneurysm.     Hyperlipidemia:  10/30/2019> , , HDL 33, LDL 55.  Stable on Lipitor 20mg  He is intolerant of Zocor greater than 40mg as it gives him muscle aches and pain. Plan:  Joanie Manjarrez has a stable cardiac status. All cardiac test and lab results were personally reviewed by me during this office visit. 1. No med changes  2. Labs thru [facility] kidney transplant program  3. RTO 9 months    I appreciate the opportunity of cooperating in the care of this patient. Jose A Dumont M.D., [address] attestation: This note was scribed in the presence of Dr. Tee Sánchez MD, by Kael Calderon RN. The scribe's documentation has been prepared under my direction and personally reviewed by me in its entirety. I confirm that the note above accurately reflects all work, treatment, procedures, and medical decision making performed by me.

## 2020-07-31 ENCOUNTER — OFFICE VISIT (OUTPATIENT)
Dept: CARDIOLOGY CLINIC | Age: 75
End: 2020-07-31
Payer: MEDICARE

## 2020-07-31 ENCOUNTER — HOSPITAL ENCOUNTER (OUTPATIENT)
Dept: NON INVASIVE DIAGNOSTICS | Age: 75
Discharge: HOME | End: 2020-07-31
Payer: MEDICARE

## 2020-07-31 VITALS
SYSTOLIC BLOOD PRESSURE: 122 MMHG | BODY MASS INDEX: 28.58 KG/M2 | DIASTOLIC BLOOD PRESSURE: 66 MMHG | WEIGHT: 193 LBS | HEIGHT: 69 IN | OXYGEN SATURATION: 97 % | HEART RATE: 62 BPM

## 2020-07-31 LAB
LV EF: 60 %
LVEF MODALITY: NORMAL

## 2020-07-31 PROCEDURE — 3017F COLORECTAL CA SCREEN DOC REV: CPT | Performed by: INTERNAL MEDICINE

## 2020-07-31 PROCEDURE — 1123F ACP DISCUSS/DSCN MKR DOCD: CPT | Performed by: INTERNAL MEDICINE

## 2020-07-31 PROCEDURE — G8427 DOCREV CUR MEDS BY ELIG CLIN: HCPCS | Performed by: INTERNAL MEDICINE

## 2020-07-31 PROCEDURE — 4040F PNEUMOC VAC/ADMIN/RCVD: CPT | Performed by: INTERNAL MEDICINE

## 2020-07-31 PROCEDURE — 99214 OFFICE O/P EST MOD 30 MIN: CPT | Performed by: INTERNAL MEDICINE

## 2020-07-31 PROCEDURE — 1036F TOBACCO NON-USER: CPT | Performed by: INTERNAL MEDICINE

## 2020-07-31 PROCEDURE — 93306 TTE W/DOPPLER COMPLETE: CPT

## 2020-07-31 PROCEDURE — G8417 CALC BMI ABV UP PARAM F/U: HCPCS | Performed by: INTERNAL MEDICINE

## 2021-01-08 DIAGNOSIS — I51.7 LVH (LEFT VENTRICULAR HYPERTROPHY): Chronic | ICD-10-CM

## 2021-01-08 DIAGNOSIS — I10 ESSENTIAL HYPERTENSION, MALIGNANT: Chronic | ICD-10-CM

## 2021-01-08 DIAGNOSIS — Z94.0 KIDNEY TRANSPLANT RECIPIENT: ICD-10-CM

## 2021-01-08 DIAGNOSIS — I50.32 CHRONIC DIASTOLIC HEART FAILURE (HCC): Chronic | ICD-10-CM

## 2021-01-11 RX ORDER — METOPROLOL TARTRATE 50 MG/1
TABLET, FILM COATED ORAL
Qty: 180 TABLET | Refills: 3 | Status: SHIPPED | OUTPATIENT
Start: 2021-01-11 | End: 2022-01-05

## 2021-03-26 NOTE — PROGRESS NOTES
[address]   Advanced Heart Failure/Pulmonary Hypertension  Cardiac Evaluation      Capo Omalley  YOB: 1945    Date of Visit:  4/14/21  Chief Complaint   Patient presents with    Follow-up     9 month f/u    Congestive Heart Failure    Edema      History of Present Illness:  Mr. Capo Omalley is a 77 y.o. gentleman with a history of his carotid disease, CKD (Dr. Janneth Ridley at [facility]), CHF, hyperlipidemia, and hypertension. History of cardiac catheterization at [facility] in 2007 with normal coronaries. He underwent a kidney transplant in December 2014 up at [facility]; he maintainws follow up in [address]  every six months for years. On 10/5/18, he reported that he had a biopsy and was diagnosed with prostate cancer. He is followed by radiation doctor at [facility] they are monitoring. No treatment at this time. He lost his brother Tanya's Day 2020 from lung disease. He had a bout of shingles before last visit 7/31/2020. Today, he is here for regular follow up. He feels fairly well overall. He denies exertional chest pain, CARUSO/PND, palpitations, light-headedness. He has chronic LE edema, not worsening. Allergies   Allergen Reactions    Everolimus Rash     Causes Thrush    Statins Other (See Comments)     Muscle degeneration     Current Outpatient Medications   Medication Sig Dispense Refill    metoprolol tartrate (LOPRESSOR) 50 MG tablet TAKE ONE TABLET BY MOUTH TWICE A  tablet 3    atorvastatin (LIPITOR) 20 MG tablet Take 1 tablet by mouth daily 90 tablet 3    amLODIPine (NORVASC) 5 MG tablet TAKE 1 TABLET EVERY DAY 90 tablet 3    furosemide (LASIX) 40 MG tablet Take 0.5 tablets by mouth 2 times daily 90 tablet 3    nitroGLYCERIN (NITROSTAT) 0.4 MG SL tablet Place 1 tablet under the tongue as needed (Take for chest pain.   Up to 3 tablets 5 minutes apart for continued chest pain.) 25 tablet 3    omeprazole (PRILOSEC) 20 MG capsule Take 20 mg by mouth Daily      Mycophenolate Sodium 360 MG TBEC Take 720 mg by mouth 2 times daily      cycloSPORINE modified (NEORAL) 100 MG capsule Take 75 mg by mouth 2 times daily       sulfamethoxazole-trimethoprim (BACTRIM DS) 800-160 MG per tablet Take 1 tablet by mouth daily       iron polysaccharides (NU-IRON) 150 MG capsule Take 150 mg by mouth 3 times daily       docusate sodium (COLACE) 100 MG capsule Take 200 mg by mouth 2 times daily as needed       fluticasone (VERAMYST) 27.5 MCG/SPRAY nasal spray 2 sprays by Nasal route daily.  tamsulosin (FLOMAX) 0.4 MG capsule Take 0.4 mg by mouth daily.  loratadine (CLARITIN) 10 MG tablet Take 10 mg by mouth daily.  alprazolam (XANAX) 0.25 MG tablet Take 0.25 mg by mouth 2 times daily.  albuterol (PROVENTIL;VENTOLIN) 90 MCG/ACT inhaler Inhale 2 puffs into the lungs every 6 hours as needed.  salmeterol (SEREVENT DISKUS) 50 MCG/DOSE diskus inhaler Inhale 1 puff into the lungs 2 times daily.  aspirin 325 MG tablet Take 325 mg by mouth daily.  Coenzyme Q10 (CO Q 10) 10 MG CAPS Take 1 capsule by mouth daily. No current facility-administered medications for this visit.         Past Medical History:   Diagnosis Date    Cardiomyopathy [facility])     Carotid artery disease ([address]     CHF (congestive heart failure) (HCC)     Hyperlipidemia     Hypertension     LV dysfunction      Past Surgical History:   Procedure Laterality Date    KIDNEY TRANSPLANT      OTHER SURGICAL HISTORY  11/30/11    Peritoneal catheter     Family History   Problem Relation Age of Onset    High Blood Pressure Mother     Kidney Disease Father      Social History     Socioeconomic History    Marital status:      Spouse name: Not on file    Number of children: Not on file    Years of education: Not on file    Highest education level: Not on file   Occupational History    Not on file   Social Needs    Financial resource strain: Not on file    Food insecurity depression. · Endocrine: No malaise, fatigue or temperature intolerance. No excessive thirst, fluid intake, or urination. No tremor. · Hematologic/Lymphatic: No abnormal bruising or bleeding, blood clots or swollen lymph nodes. · Allergic/Immunologic: No nasal congestion or hives. Physical Examination:    Vitals:    04/14/21 0905   BP: 120/72   Site: Right Upper Arm   Position: Sitting   Cuff Size: Medium Adult   Pulse: 66   SpO2: 98%   Weight: 191 lb (86.6 kg)   Height: 5' 9\" (1.753 m)     Body mass index is 28.21 kg/m². Wt Readings from Last 3 Encounters:   04/14/21 191 lb (86.6 kg)   07/31/20 193 lb (87.5 kg)   12/20/19 192 lb 12.8 oz (87.5 kg)     BP Readings from Last 3 Encounters:   04/14/21 120/72   07/31/20 122/66   12/20/19 128/76     Constitutional and General Appearance:   WD/WN in NAD  HEENT:  NC/AT  STANTON  Respiratory:  · Normal excursion and expansion without use of accessory muscles  · Resp Auscultation: Normal breath sounds without dullness  Cardiovascular:  · The apical impulses not displaced  · Heart tones are crisp and normal  · Cervical veins are not engorged  · The carotid upstroke is normal in amplitude and contour without delay or bruit  · JVP less than 8 cm H2O  RRR with nl S1 and S2 without m,r,g  · Peripheral pulses are symmetrical and full  · There is no clubbing, cyanosis of the extremities. · No edema  · Femoral Arteries: 2+ and equal  · Pedal Pulses: 2+ and equal   Abdomen:  · No masses or tenderness  · Liver/Spleen: No Abnormalities Noted  Neurological/Psychiatric:  · Alert and oriented in all spheres  · Moves all extremities well  · Exhibits normal gait balance and coordination  · No abnormalities of mood, affect, memory, mentation, or behavior are noted    Diagnostic Testing:    Echo 10/2017  Normal left ventricle size and systolic function with an estimated ejection fraction of 65%.  No regional wall motion abnormalities are seen.  Klever Motto is concentric left ventricular hypertrophy.   Diastolic filling parameters suggests grade II diastolic dysfunction .   There is mild tricuspid regurgitation with RVSP estimated at 32 mmHg. The left atrium is enlarged.     Echo 9/27/2016  Normal EF 55%  Concentric LVH  Mild MR  Trivial AI  RVSP 38 mm Hg.  5/18/12 Carotids: 16-49% bilateral  5/17/12 Lower extremity arterial doppler US- normal  5/18/12 ECHO- moderate LVH, Ef 62%, diastolic noncompliance. 3/2012 Lexiscan- EF 65%, perfusion normal.     Testing at [facility] includes:  Carotid ultrasound - SUNNI 34-74%, LICA 55-12%. Echo - mild septal LVH, EF 14%, stage 1 diastolic dysfunction, severe LAE, dilated aorta with mid ascending aorta at 4.0 cm, chordal JASMIN at rest with trivial MR and peak LVOT gradient of 11 mmHg. Nitrite the LVOT gradient is 22 mmHg and no increase in JASMIN or MR. Labs were reviewed including labs from other hospital systems through [facility]. Cardiac testing was reviewed including echos, nuclear scans, cardiac catheterization, including from other hospital systems through [facility]. Assessment:  1. Chronic diastolic heart failure ([address]    2. Essential hypertension, malignant Accelerated    3. Kidney transplant recipient    4. LVH (left ventricular hypertrophy)    5. Essential hypertension    6. Other hyperlipidemia    7. SOB (shortness of breath)      1. Chronic diastolic heart failure: Stable. Compensated by exam.  -ECHO 7/31/20 prelim> Stable findings  -ECHO 97/90/31> Diastolic filling parameters suggests grade II diastolic dysfunction .  -Continue Lasix, Metoprolol    No Ace or ARB due to kidney transplant and increasing creatine. 2. Essential hypertension: Stable. Controlled on Amlodipine & Lopressor.    /72 (Site: Right Upper Arm, Position: Sitting, Cuff Size: Medium Adult)   Pulse 66   Ht 5' 9\" (1.753 m)   Wt 191 lb (86.6 kg)   SpO2 98%   BMI 28.21 kg/m²         Hyperlipidemia:  10/30/2019> , , HDL 33,

## 2021-04-14 ENCOUNTER — OFFICE VISIT (OUTPATIENT)
Dept: CARDIOLOGY CLINIC | Age: 76
End: 2021-04-14
Payer: MEDICARE

## 2021-04-14 VITALS
DIASTOLIC BLOOD PRESSURE: 72 MMHG | OXYGEN SATURATION: 98 % | BODY MASS INDEX: 28.29 KG/M2 | SYSTOLIC BLOOD PRESSURE: 120 MMHG | WEIGHT: 191 LBS | HEART RATE: 66 BPM | HEIGHT: 69 IN

## 2021-04-14 DIAGNOSIS — Z94.0 KIDNEY TRANSPLANT RECIPIENT: ICD-10-CM

## 2021-04-14 DIAGNOSIS — I10 ESSENTIAL HYPERTENSION, MALIGNANT: ICD-10-CM

## 2021-04-14 DIAGNOSIS — R06.02 SOB (SHORTNESS OF BREATH): ICD-10-CM

## 2021-04-14 DIAGNOSIS — I50.32 CHRONIC DIASTOLIC HEART FAILURE (HCC): Primary | ICD-10-CM

## 2021-04-14 DIAGNOSIS — I51.7 LVH (LEFT VENTRICULAR HYPERTROPHY): ICD-10-CM

## 2021-04-14 DIAGNOSIS — E78.49 OTHER HYPERLIPIDEMIA: Chronic | ICD-10-CM

## 2021-04-14 DIAGNOSIS — I10 ESSENTIAL HYPERTENSION: ICD-10-CM

## 2021-04-14 PROCEDURE — G8427 DOCREV CUR MEDS BY ELIG CLIN: HCPCS | Performed by: INTERNAL MEDICINE

## 2021-04-14 PROCEDURE — 1036F TOBACCO NON-USER: CPT | Performed by: INTERNAL MEDICINE

## 2021-04-14 PROCEDURE — 1123F ACP DISCUSS/DSCN MKR DOCD: CPT | Performed by: INTERNAL MEDICINE

## 2021-04-14 PROCEDURE — G8417 CALC BMI ABV UP PARAM F/U: HCPCS | Performed by: INTERNAL MEDICINE

## 2021-04-14 PROCEDURE — 4040F PNEUMOC VAC/ADMIN/RCVD: CPT | Performed by: INTERNAL MEDICINE

## 2021-04-14 PROCEDURE — 3017F COLORECTAL CA SCREEN DOC REV: CPT | Performed by: INTERNAL MEDICINE

## 2021-04-14 PROCEDURE — 99214 OFFICE O/P EST MOD 30 MIN: CPT | Performed by: INTERNAL MEDICINE

## 2021-05-03 LAB
B-TYPE NATRIURETIC PEPTIDE: 51 PG/ML (ref 0–100)
CHOLESTEROL, TOTAL: 189 MG/DL (ref 0–200)
HDLC SERPL-MCNC: 30 MG/DL (ref 60–92)
LDL CHOLESTEROL CALCULATED: 127 MG/DL
TRIGL SERPL-MCNC: 161 MG/DL (ref 10–149)

## 2022-01-05 DIAGNOSIS — Z94.0 KIDNEY TRANSPLANT RECIPIENT: ICD-10-CM

## 2022-01-05 DIAGNOSIS — I51.7 LVH (LEFT VENTRICULAR HYPERTROPHY): Chronic | ICD-10-CM

## 2022-01-05 DIAGNOSIS — I10 ESSENTIAL HYPERTENSION, MALIGNANT: Chronic | ICD-10-CM

## 2022-01-05 DIAGNOSIS — I50.32 CHRONIC DIASTOLIC HEART FAILURE (HCC): Chronic | ICD-10-CM

## 2022-01-05 RX ORDER — METOPROLOL TARTRATE 50 MG/1
TABLET, FILM COATED ORAL
Qty: 180 TABLET | Refills: 3 | Status: SHIPPED | OUTPATIENT
Start: 2022-01-05

## 2022-04-06 ENCOUNTER — OFFICE VISIT (OUTPATIENT)
Dept: CARDIOLOGY CLINIC | Age: 77
End: 2022-04-06
Payer: MEDICARE

## 2022-04-06 VITALS
SYSTOLIC BLOOD PRESSURE: 132 MMHG | OXYGEN SATURATION: 97 % | WEIGHT: 186 LBS | HEART RATE: 65 BPM | DIASTOLIC BLOOD PRESSURE: 80 MMHG | BODY MASS INDEX: 27.55 KG/M2 | HEIGHT: 69 IN

## 2022-04-06 DIAGNOSIS — Z13.29 THYROID DISORDER SCREEN: ICD-10-CM

## 2022-04-06 DIAGNOSIS — I10 ESSENTIAL HYPERTENSION, MALIGNANT: ICD-10-CM

## 2022-04-06 DIAGNOSIS — I50.32 CHRONIC DIASTOLIC HEART FAILURE (HCC): Primary | ICD-10-CM

## 2022-04-06 DIAGNOSIS — I65.23 BILATERAL CAROTID ARTERY STENOSIS: ICD-10-CM

## 2022-04-06 DIAGNOSIS — I10 ESSENTIAL HYPERTENSION: ICD-10-CM

## 2022-04-06 DIAGNOSIS — Z94.0 KIDNEY TRANSPLANT RECIPIENT: ICD-10-CM

## 2022-04-06 DIAGNOSIS — I51.7 LVH (LEFT VENTRICULAR HYPERTROPHY): ICD-10-CM

## 2022-04-06 DIAGNOSIS — E78.49 OTHER HYPERLIPIDEMIA: ICD-10-CM

## 2022-04-06 PROCEDURE — 99214 OFFICE O/P EST MOD 30 MIN: CPT | Performed by: INTERNAL MEDICINE

## 2022-04-06 PROCEDURE — 1123F ACP DISCUSS/DSCN MKR DOCD: CPT | Performed by: INTERNAL MEDICINE

## 2022-04-06 PROCEDURE — G8417 CALC BMI ABV UP PARAM F/U: HCPCS | Performed by: INTERNAL MEDICINE

## 2022-04-06 PROCEDURE — 4040F PNEUMOC VAC/ADMIN/RCVD: CPT | Performed by: INTERNAL MEDICINE

## 2022-04-06 PROCEDURE — 93000 ELECTROCARDIOGRAM COMPLETE: CPT | Performed by: INTERNAL MEDICINE

## 2022-04-06 PROCEDURE — 1036F TOBACCO NON-USER: CPT | Performed by: INTERNAL MEDICINE

## 2022-04-06 PROCEDURE — G8427 DOCREV CUR MEDS BY ELIG CLIN: HCPCS | Performed by: INTERNAL MEDICINE

## 2022-04-06 PROCEDURE — 3017F COLORECTAL CA SCREEN DOC REV: CPT | Performed by: INTERNAL MEDICINE

## 2022-04-06 RX ORDER — NITROGLYCERIN 0.4 MG/1
0.4 TABLET SUBLINGUAL PRN
Qty: 25 TABLET | Refills: 3 | Status: SHIPPED | OUTPATIENT
Start: 2022-04-06

## 2022-04-06 NOTE — LETTER
[address]  Dept: [phone]  Dept Fax: [phone]      2022    Patient: Cherri Snellen  :   DOS: 2022    To Whom it May Concern: It is my medical opinion that Cherri Snellen requires an updated handicap placard as he is unable to walk more than 200 feet without stopping to rest.     Duration: Five years    If you have any questions or concerns, please do not hesitate to call.     Sincerely,          Tootie Davis MD

## 2022-04-06 NOTE — PROGRESS NOTES
[facility]   Advanced Heart Failure/Pulmonary Hypertension  Cardiac Evaluation      Zhane Walton  YOB: 1945    Date of Visit:  4/6/22     Chief Complaint   Patient presents with    1 Year Follow Up    Congestive Heart Failure      History of Present Illness:  Mr. Zhane Walton is a 77 y.o. gentleman with a history of his carotid disease, CHF, hyperlipidemia, hypertension, CKD. History of cardiac catheterization at [facility] in 2007 with normal coronaries. He underwent a kidney transplant in December 2014 up at [facility]; he followed up in [place] for years but is now with Dr. Anibal Torres at [facility].     On 10/5/18, he reported that he had a biopsy and was diagnosed with prostate cancer. He is followed by radiation doctor at [facility] they are monitoring. No treatment at this time. He lost his brother Tanya's Day 2020 from lung disease. He had a bout of shingles before last visit 7/31/2020. Today, he is here for regular follow up. He states he has intermittent episodes of upper chest tightness if he \"overexerts\" himself, always resolves with rest and a NTG. He has chronic LE edema, not worsening. He has ongoing SOB, states his asthma is worsening and f/w an allergist who has him using nebulizers every day. He denies PND, palpitations, light-headedness.      Allergies   Allergen Reactions    Everolimus Rash     Causes Thrush    Statins Other (See Comments)     Muscle degeneration     Current Outpatient Medications   Medication Sig Dispense Refill    metoprolol tartrate (LOPRESSOR) 50 MG tablet TAKE ONE TABLET BY MOUTH TWICE A  tablet 3    atorvastatin (LIPITOR) 20 MG tablet Take 1 tablet by mouth daily 90 tablet 3    amLODIPine (NORVASC) 5 MG tablet TAKE 1 TABLET EVERY DAY 90 tablet 3    furosemide (LASIX) 40 MG tablet Take 0.5 tablets by mouth 2 times daily 90 tablet 3    nitroGLYCERIN (NITROSTAT) 0.4 MG SL tablet Place 1 tablet under the tongue as needed (Take for chest pain.  Up to 3 tablets 5 minutes apart for continued chest pain.) 25 tablet 3    omeprazole (PRILOSEC) 20 MG capsule Take 20 mg by mouth Daily      Mycophenolate Sodium 360 MG TBEC Take 720 mg by mouth 2 times daily      cycloSPORINE modified (NEORAL) 100 MG capsule Take 75 mg by mouth 2 times daily       sulfamethoxazole-trimethoprim (BACTRIM DS) 800-160 MG per tablet Take 1 tablet by mouth daily       iron polysaccharides (NU-IRON) 150 MG capsule Take 150 mg by mouth 3 times daily       docusate sodium (COLACE) 100 MG capsule Take 200 mg by mouth 2 times daily as needed       fluticasone (VERAMYST) 27.5 MCG/SPRAY nasal spray 2 sprays by Nasal route daily.  tamsulosin (FLOMAX) 0.4 MG capsule Take 0.4 mg by mouth daily.  loratadine (CLARITIN) 10 MG tablet Take 10 mg by mouth daily.  alprazolam (XANAX) 0.25 MG tablet Take 0.25 mg by mouth 2 times daily.  albuterol (PROVENTIL;VENTOLIN) 90 MCG/ACT inhaler Inhale 2 puffs into the lungs every 6 hours as needed.  salmeterol (SEREVENT DISKUS) 50 MCG/DOSE diskus inhaler Inhale 1 puff into the lungs 2 times daily.  aspirin 325 MG tablet Take 325 mg by mouth daily.  Coenzyme Q10 (CO Q 10) 10 MG CAPS Take 1 capsule by mouth daily. No current facility-administered medications for this visit.        Past Medical History:   Diagnosis Date    Cardiomyopathy [facility])     [facility] artery disease ([facility]     CHF (congestive heart failure) (HCC)     Hyperlipidemia     Hypertension     LV dysfunction      Past Surgical History:   Procedure Laterality Date    KIDNEY TRANSPLANT      OTHER SURGICAL HISTORY  11/30/11    Peritoneal catheter     Family History   Problem Relation Age of Onset    High Blood Pressure Mother     Kidney Disease Father      Social History     Socioeconomic History    Marital status:      Spouse name: Not on file    Number of children: Not on file    Years of education: Not on file   Zannie Cowden Highest education level: Not on file   Occupational History    Not on file   Tobacco Use    Smoking status: Never Smoker    Smokeless tobacco: Never Used   Vaping Use    Vaping Use: Never used   Substance and Sexual Activity    Alcohol use: No    Drug use: No    Sexual activity: Yes     Partners: Female     Comment:    Other Topics Concern    Not on file   Social History Narrative    Not on file     Social Determinants of Health     Financial Resource Strain:     Difficulty of Paying Living Expenses: Not on file   Food Insecurity:     Worried About Running Out of Food in the Last Year: Not on file    Belia of Food in the Last Year: Not on file   Transportation Needs:     Lack of Transportation (Medical): Not on file    Lack of Transportation (Non-Medical): Not on file   Physical Activity:     Days of Exercise per Week: Not on file    Minutes of Exercise per Session: Not on file   Stress:     Feeling of Stress : Not on file   Social Connections:     Frequency of Communication with Friends and Family: Not on file    Frequency of Social Gatherings with Friends and Family: Not on file    Attends Evangelical Services: Not on file    Active Member of [address] [place] [address] or Organizations: Not on file    Attends Club or Organization Meetings: Not on file    Marital Status: Not on file   Intimate Partner Violence:     Fear of Current or Ex-Partner: Not on file    Emotionally Abused: Not on file    Physically Abused: Not on file    Sexually Abused: Not on file   Housing Stability:     Unable to Pay for Housing in the Last Year: Not on file    Number of [place] in the Last Year: Not on file    Unstable Housing in the Last Year: Not on file     Review of Systems:   · Constitutional: there has been no unanticipated weight loss. There's been no change in energy level, sleep pattern, or activity level. · Eyes: No visual changes or diplopia. No scleral icterus.   · ENT: No Headaches, hearing loss or vertigo. No mouth sores or sore throat. · Cardiovascular: Reviewed in HPI    · Respiratory: No cough or wheezing, no sputum production. No hematemesis. · Gastrointestinal: No abdominal pain, appetite loss, blood in stools. No change in bowel or bladder habits. · Genitourinary: No dysuria, trouble voiding, or hematuria. · Musculoskeletal:  No gait disturbance, weakness or joint complaints. · Integumentary: No rash or pruritis. · Neurological: No headache, diplopia, change in muscle strength, numbness or tingling. No change in gait, balance, coordination, mood, affect, memory, mentation, behavior. · Psychiatric: No anxiety, no depression. · Endocrine: No malaise, fatigue or temperature intolerance. No excessive thirst, fluid intake, or urination. No tremor. · Hematologic/Lymphatic: No abnormal bruising or bleeding, blood clots or swollen lymph nodes. · Allergic/Immunologic: No nasal congestion or hives. Physical Examination:    Vitals:    04/06/22 1019   BP: 132/80   Site: Right Upper Arm   Position: Sitting   Cuff Size: Medium Adult   Pulse: 65   SpO2: 97%   Weight: 186 lb (84.4 kg)   Height: 5' 9\" (1.753 m)     Body mass index is 27.47 kg/m².      Wt Readings from Last 3 Encounters:   04/06/22 186 lb (84.4 kg)   04/14/21 191 lb (86.6 kg)   07/31/20 193 lb (87.5 kg)     BP Readings from Last 3 Encounters:   04/06/22 132/80   04/14/21 120/72   07/31/20 122/66     Constitutional and General Appearance:   WD/WN in NAD  HEENT:  NC/AT  STANTON  Respiratory:  · Normal excursion and expansion without use of accessory muscles  · Resp Auscultation: Normal breath sounds without dullness  Cardiovascular:  · The apical impulses not displaced  · Heart tones are crisp and normal  · Cervical veins are not engorged  · The carotid upstroke is normal in amplitude and contour without delay or bruit  · JVP less than 8 cm H2O  RRR with nl S1 and S2 without m,r,g  · Peripheral pulses are symmetrical and full  · There is no clubbing, cyanosis of the extremities. · No edema  · Femoral Arteries: 2+ and equal  · Pedal Pulses: 2+ and equal   Abdomen:  · No masses or tenderness  · Liver/Spleen: No Abnormalities Noted  Neurological/Psychiatric:  · Alert and oriented in all spheres  · Moves all extremities well  · Exhibits normal gait balance and coordination  · No abnormalities of mood, affect, memory, mentation, or behavior are noted    Diagnostic Testing:    ECHO 7/31/20  Left ventricle - mild concentric LVH, Normal size and function with EF of 60%   *Mitral valve - mild regurgitation   *Left atrium - dilated   *Tricuspid valve - mild regurgitation    Echo 10/2017  Normal left ventricle size and systolic function with an estimated ejection fraction of 65%. No regional wall motion abnormalities are seen.  Eliza Marlo is concentric left ventricular hypertrophy.   Diastolic filling parameters suggests grade II diastolic dysfunction .   There is mild tricuspid regurgitation with RVSP estimated at 32 mmHg. The left atrium is enlarged.     Echo 9/27/2016  Normal EF 55%  Concentric LVH  Mild MR  Trivial AI  RVSP 38 mm Hg.  5/18/12 Carotids: 16-49% bilateral  5/17/12 Lower extremity arterial doppler US- normal  5/18/12 ECHO- moderate LVH, Ef 47%, diastolic noncompliance. 3/2012 Lexiscan- EF 65%, perfusion normal.     Testing at [facility] includes:  Carotid ultrasound - SUNNI 39-70%, LICA 15-81%. Echo - mild septal LVH, EF 06%, stage 1 diastolic dysfunction, severe LAE, dilated aorta with mid ascending aorta at 4.0 cm, chordal JASMIN at rest with trivial MR and peak LVOT gradient of 11 mmHg. Nitrite the LVOT gradient is 22 mmHg and no increase in JASMIN or MR. Labs were reviewed including labs from other hospital systems through [facility]. Cardiac testing was reviewed including echos, nuclear scans, cardiac catheterization, including from other hospital systems through [facility]. Assessment:  1.  Chronic diastolic heart failure ([address]    2. Essential hypertension, malignant Accelerated    3. Kidney transplant recipient    4. LVH (left ventricular hypertrophy)    5. Essential hypertension    6. Bilateral carotid artery stenosis    7. Other hyperlipidemia    8. Thyroid disorder screen      1. Chronic diastolic heart failure: Stable. Compensated by exam.  -ECHO 7/31/20> EF 60%  -ECHO 09/11/88> Diastolic filling parameters suggests grade II diastolic dysfunction .  -Continue Lasix & Metoprolol    No Ace or ARB due to kidney transplant and increasing creatine. 2. Essential hypertension: Stable. Controlled on Amlodipine & Lopressor. /80 (Site: Right Upper Arm, Position: Sitting, Cuff Size: Medium Adult)   Pulse 65   Ht 5' 9\" (1.753 m)   Wt 186 lb (84.4 kg)   SpO2 97%   BMI 27.47 kg/m²       EKG today 4/6/22 (read & interpreted by me)> NSR 62      Hyperlipidemia:  5/3/21> , , HDL 30, . Due for recheck. Stable on Lipitor 20mg  He is intolerant of Zocor greater than 40mg as it gives him muscle aches and pain. Carotid artery disease: Stable. Needs repeat dopplers. Dopplers 3896> SUNNI 12-82%, LICA 44-17% from November of 2011. Dopplers 5/18/12> 16-49% bilateral.    Abd aorta ultrasound [facility])> No evidence of abd aortic aneurysm. 3. Kidney transplant recipient:  December 2014 at [facility]. Now f/w  renal program Dr. Julianne Peralta     4. LVH (left ventricular hypertrophy):  Noted on ECHO        Plan:  1. No med changes  2. Labs thru  kidney transplant program q 3 months. Will add BNP, lipids, TSH w/ to next draw. 3. Carotid dopplers soon  4. RTO in 1 year with ECHO same day  5. Handicap updated letter given per his request.    Time Based Itemization  A total of 30 minutes was spent on today's patient encounter.   If applicable, non-patient-facing activities:  ( x)Preparing to see the patient and reviewing records  ( ) Individual interpretation of results  ( ) Discussion or coordination of care with other health care professionals  ( x) Ordering of unique tests, medications, or procedures  ( x) Documentation within the EHR. time      I appreciate the opportunity of cooperating in the care of this patient. [place], M.D., [address] attestation: This note was scribed in the presence of Dr. Sly Fox MD, by Jose Szymanski RN. The scribe's documentation has been prepared under my direction and personally reviewed by me in its entirety. I confirm that the note above accurately reflects all work, treatment, procedures, and medical decision making performed by me.

## 2022-04-08 ENCOUNTER — HOSPITAL ENCOUNTER (OUTPATIENT)
Dept: VASCULAR LAB | Age: 77
Discharge: HOME | End: 2022-04-08
Payer: MEDICARE

## 2022-04-08 DIAGNOSIS — I65.23 BILATERAL CAROTID ARTERY STENOSIS: ICD-10-CM

## 2022-04-08 PROCEDURE — 93880 EXTRACRANIAL BILAT STUDY: CPT

## 2022-06-24 DIAGNOSIS — I50.32 CHRONIC DIASTOLIC HEART FAILURE (HCC): Chronic | ICD-10-CM

## 2022-06-24 DIAGNOSIS — I10 ESSENTIAL HYPERTENSION, MALIGNANT: Chronic | ICD-10-CM

## 2022-06-24 DIAGNOSIS — Z94.0 KIDNEY TRANSPLANT RECIPIENT: ICD-10-CM

## 2022-06-24 DIAGNOSIS — I51.7 LVH (LEFT VENTRICULAR HYPERTROPHY): Chronic | ICD-10-CM

## 2022-06-24 RX ORDER — AMLODIPINE BESYLATE 5 MG/1
5 TABLET ORAL DAILY
Qty: 90 TABLET | Refills: 3 | Status: SHIPPED | OUTPATIENT
Start: 2022-06-24

## 2023-01-02 DIAGNOSIS — I50.32 CHRONIC DIASTOLIC HEART FAILURE (HCC): Chronic | ICD-10-CM

## 2023-01-02 DIAGNOSIS — I10 ESSENTIAL HYPERTENSION, MALIGNANT: Chronic | ICD-10-CM

## 2023-01-02 DIAGNOSIS — I51.7 LVH (LEFT VENTRICULAR HYPERTROPHY): Chronic | ICD-10-CM

## 2023-01-02 DIAGNOSIS — Z94.0 KIDNEY TRANSPLANT RECIPIENT: ICD-10-CM

## 2023-01-04 DIAGNOSIS — I50.32 CHRONIC DIASTOLIC HEART FAILURE (HCC): Chronic | ICD-10-CM

## 2023-01-04 DIAGNOSIS — I51.7 LVH (LEFT VENTRICULAR HYPERTROPHY): Chronic | ICD-10-CM

## 2023-01-04 DIAGNOSIS — Z94.0 KIDNEY TRANSPLANT RECIPIENT: ICD-10-CM

## 2023-01-04 DIAGNOSIS — I10 ESSENTIAL HYPERTENSION, MALIGNANT: Chronic | ICD-10-CM

## 2023-01-04 RX ORDER — METOPROLOL TARTRATE 50 MG/1
TABLET, FILM COATED ORAL
Qty: 180 TABLET | Refills: 3 | Status: SHIPPED | OUTPATIENT
Start: 2023-01-04

## 2023-01-04 NOTE — TELEPHONE ENCOUNTER
Last OV: 04/06/2022  Anita  Last Ekg 04/06/2022  Anita  Next OV: 04/07/2023  Anita  Last Refill: 01/05/2022  Sara Reyes

## 2023-01-06 RX ORDER — METOPROLOL TARTRATE 50 MG/1
50 TABLET, FILM COATED ORAL 2 TIMES DAILY
Qty: 180 TABLET | Refills: 3 | Status: SHIPPED | OUTPATIENT
Start: 2023-01-06

## 2023-04-07 ENCOUNTER — TELEPHONE (OUTPATIENT)
Dept: CARDIOLOGY CLINIC | Age: 78
End: 2023-04-07

## 2023-04-07 ENCOUNTER — HOSPITAL ENCOUNTER (OUTPATIENT)
Dept: NON INVASIVE DIAGNOSTICS | Age: 78
Discharge: HOME | End: 2023-04-07
Payer: MEDICARE

## 2023-04-07 DIAGNOSIS — I50.32 CHRONIC DIASTOLIC HEART FAILURE (HCC): ICD-10-CM

## 2023-04-07 LAB
LV EF: 55 %
LVEF MODALITY: NORMAL

## 2023-04-07 PROCEDURE — 93306 TTE W/DOPPLER COMPLETE: CPT

## 2023-04-07 NOTE — TELEPHONE ENCOUNTER
Left message for pt that MACIEJ spoke to Dr. Nickie Florence regarding post transplant kidney care. She has agreed to take him as a patient. Placed referral.   Left message for pt with this information and Dr. Holloway [place] phone number. Left message for any questions.

## 2023-06-15 DIAGNOSIS — Z94.0 KIDNEY TRANSPLANT RECIPIENT: ICD-10-CM

## 2023-06-15 DIAGNOSIS — I50.32 CHRONIC DIASTOLIC HEART FAILURE (HCC): Chronic | ICD-10-CM

## 2023-06-15 DIAGNOSIS — I10 ESSENTIAL HYPERTENSION, MALIGNANT: Chronic | ICD-10-CM

## 2023-06-15 DIAGNOSIS — I51.7 LVH (LEFT VENTRICULAR HYPERTROPHY): Chronic | ICD-10-CM

## 2023-06-19 RX ORDER — AMLODIPINE BESYLATE 5 MG/1
TABLET ORAL
Qty: 90 TABLET | Refills: 3 | Status: SHIPPED | OUTPATIENT
Start: 2023-06-19

## 2023-07-31 DIAGNOSIS — I50.32 CHRONIC DIASTOLIC HEART FAILURE (HCC): Chronic | ICD-10-CM

## 2023-07-31 DIAGNOSIS — I10 ESSENTIAL HYPERTENSION, MALIGNANT: Chronic | ICD-10-CM

## 2023-07-31 DIAGNOSIS — Z94.0 KIDNEY TRANSPLANT RECIPIENT: ICD-10-CM

## 2023-07-31 DIAGNOSIS — I51.7 LVH (LEFT VENTRICULAR HYPERTROPHY): Chronic | ICD-10-CM

## 2023-07-31 RX ORDER — AMLODIPINE BESYLATE 5 MG/1
5 TABLET ORAL DAILY
Qty: 90 TABLET | Refills: 3 | Status: CANCELLED | OUTPATIENT
Start: 2023-07-31

## 2023-07-31 NOTE — TELEPHONE ENCOUNTER
From: Romero Owens  To:  Office of Dr. Betina Burton: 6/16/2023  3:12 PM EDT  Subject: Medication Renewal Request    Refills have been requested for the following medications:        amLODIPine (NORVASC) 5 MG tablet [Dr. Osmani Merritt MD]    Preferred pharmacy: [facility] [id number] - [id number], [address] [place] [address] [phone] [facility] [phone]    Medication sent in for 1 year on 06/19/2023

## 2024-01-02 DIAGNOSIS — I10 ESSENTIAL HYPERTENSION, MALIGNANT: Chronic | ICD-10-CM

## 2024-01-02 DIAGNOSIS — Z94.0 KIDNEY TRANSPLANT RECIPIENT: ICD-10-CM

## 2024-01-02 DIAGNOSIS — I51.7 LVH (LEFT VENTRICULAR HYPERTROPHY): Chronic | ICD-10-CM

## 2024-01-02 DIAGNOSIS — I50.32 CHRONIC DIASTOLIC HEART FAILURE (HCC): Chronic | ICD-10-CM

## 2024-01-02 RX ORDER — METOPROLOL TARTRATE 50 MG/1
50 TABLET, FILM COATED ORAL 2 TIMES DAILY
Qty: 180 TABLET | Refills: 3 | Status: SHIPPED | OUTPATIENT
Start: 2024-01-02

## 2024-01-23 ENCOUNTER — TELEPHONE (OUTPATIENT)
Dept: CARDIOLOGY CLINIC | Age: 79
End: 2024-01-23

## 2024-01-23 NOTE — TELEPHONE ENCOUNTER
Unless his symptoms have changed, I would just get an echo every other year, so he doesn't need one this year.  MACIEJ

## 2024-01-23 NOTE — TELEPHONE ENCOUNTER
Pt is scheduled to see MACIEJ 4/26 for his yearly visit.  He asked if he needs to have an echo.    Please advise.

## 2024-01-23 NOTE — TELEPHONE ENCOUNTER
LMOM for patient to inform per MACIEJ Unless his symptoms have changed, I would just get an echo every other year, so he doesn't need one this year

## 2024-04-25 NOTE — PROGRESS NOTES
hemodynamically significant stenosis noted in the internal carotid artery bilaterally.    ECHO 7/31/20  Left ventricle - mild concentric LVH, Normal size and function with EF of 60%  Mitral valve - mild regurgitation  Left atrium - dilated  Tricuspid valve - mild regurgitation    Echo 10/2017  Normal left ventricle size and systolic function with an estimated ejection fraction of 65%. No regional wall motion abnormalities are seen.  There is concentric left ventricular hypertrophy.  Diastolic filling parameters suggests grade II diastolic dysfunction .  There is mild tricuspid regurgitation with RVSP estimated at 32 mmHg.  The left atrium is enlarged.     Echo 9/27/2016  Normal EF 55%  Concentric LVH  Mild MR  Trivial AI  RVSP 38 mm Hg.  5/18/12 Carotids: 16-49% bilateral  5/17/12 Lower extremity arterial doppler US- normal  5/18/12 ECHO- moderate LVH, Ef 60%, diastolic noncompliance.   3/2012 Lexiscan- EF 65%, perfusion normal.     Testing at [facility] includes:  Carotid ultrasound - SUNNI 40-59%, LICA 60-79%.    Echo - mild septal LVH, EF 55%, stage 1 diastolic dysfunction, severe LAE, dilated aorta with mid ascending aorta at 4.0 cm, chordal JASMIN at rest with trivial MR and peak LVOT gradient of 11 mmHg.  Nitrite the LVOT gradient is 22 mmHg and no increase in JASMIN or MR.          Labs were reviewed including labs from other hospital systems through [facility].  Cardiac testing was reviewed including echos, nuclear scans, cardiac catheterization, including from other hospital systems through [facility].    Assessment:  No diagnosis found.    1. Chronic diastolic heart failure: Stable. Compensated by exam.  ~ECHO 4/7/23  EF Pre-Jules 55%   -ECHO 7/31/20> EF 60%  -ECHO 10/12/17> Diastolic filling parameters suggests grade II diastolic dysfunction .  -Continue Lasix & Metoprolol    No Ace or ARB due to kidney transplant and increasing creatine.      2. Essential hypertension: Stable. Controlled on 
medications.   Keep up the Great work on Blood pressure control.  Ideal Blood Pressure is 130-120/ 70-80.   See Dr. Howard in 1 year    Scribe's attestation:  This note was scribed in the presence of Katharine Howard M.D. by Ina Briones RN     The scribe's documentation has been prepared under my direction and personally reviewed by me in its entirety.  I confirm that the note above accurately reflects all work, treatment, procedures, and medical decision making performed by me.          Time Based Itemization  A total of 30 minutes was spent on today's patient encounter.  If applicable, non-patient-facing activities:  ( x)Preparing to see the patient and reviewing records  ( x) Individual interpretation of results  (x ) Discussion or coordination of care with other health care professionals.    ( x) Ordering of unique tests, medications, or procedures  ( x) Documentation within the EHR.time      I appreciate the opportunity of cooperating in the care of this patient.    Katharine Howard M.D., [facility]

## 2024-04-26 ENCOUNTER — OFFICE VISIT (OUTPATIENT)
Dept: CARDIOLOGY CLINIC | Age: 79
End: 2024-04-26
Payer: MEDICARE

## 2024-04-26 VITALS
OXYGEN SATURATION: 97 % | WEIGHT: 184 LBS | HEART RATE: 66 BPM | BODY MASS INDEX: 27.25 KG/M2 | DIASTOLIC BLOOD PRESSURE: 80 MMHG | HEIGHT: 69 IN | SYSTOLIC BLOOD PRESSURE: 134 MMHG

## 2024-04-26 DIAGNOSIS — I10 ESSENTIAL HYPERTENSION, MALIGNANT: Chronic | ICD-10-CM

## 2024-04-26 DIAGNOSIS — I50.32 CHRONIC DIASTOLIC HEART FAILURE (HCC): Primary | Chronic | ICD-10-CM

## 2024-04-26 DIAGNOSIS — Z94.0 KIDNEY TRANSPLANT RECIPIENT: ICD-10-CM

## 2024-04-26 DIAGNOSIS — I51.7 LVH (LEFT VENTRICULAR HYPERTROPHY): Chronic | ICD-10-CM

## 2024-04-26 PROCEDURE — G8427 DOCREV CUR MEDS BY ELIG CLIN: HCPCS | Performed by: INTERNAL MEDICINE

## 2024-04-26 PROCEDURE — G8419 CALC BMI OUT NRM PARAM NOF/U: HCPCS | Performed by: INTERNAL MEDICINE

## 2024-04-26 PROCEDURE — 3079F DIAST BP 80-89 MM HG: CPT | Performed by: INTERNAL MEDICINE

## 2024-04-26 PROCEDURE — 99214 OFFICE O/P EST MOD 30 MIN: CPT | Performed by: INTERNAL MEDICINE

## 2024-04-26 PROCEDURE — 3075F SYST BP GE 130 - 139MM HG: CPT | Performed by: INTERNAL MEDICINE

## 2024-04-26 PROCEDURE — 1123F ACP DISCUSS/DSCN MKR DOCD: CPT | Performed by: INTERNAL MEDICINE

## 2024-04-26 PROCEDURE — 1036F TOBACCO NON-USER: CPT | Performed by: INTERNAL MEDICINE

## 2024-04-26 RX ORDER — PREDNISONE 10 MG/1
10 TABLET ORAL DAILY
COMMUNITY
Start: 2024-04-11

## 2024-04-26 RX ORDER — ALLOPURINOL 100 MG/1
100 TABLET ORAL DAILY
COMMUNITY
Start: 2024-01-30

## 2024-04-26 RX ORDER — FUROSEMIDE 40 MG/1
20 TABLET ORAL 2 TIMES DAILY
Qty: 90 TABLET | Refills: 3 | Status: SHIPPED | OUTPATIENT
Start: 2024-04-26

## 2024-04-26 RX ORDER — NITROGLYCERIN 0.4 MG/1
0.4 TABLET SUBLINGUAL PRN
Qty: 25 TABLET | Refills: 3 | Status: SHIPPED | OUTPATIENT
Start: 2024-04-26

## 2024-04-26 NOTE — PATIENT INSTRUCTIONS
Plan:    Echo in 1 Year.  Order placed.   Continue same medications.   Keep up the Great work on Blood pressure control.  Ideal Blood Pressure is 130-120/ 70-80.   See Dr. Howard in 1 year

## 2024-06-11 DIAGNOSIS — Z94.0 KIDNEY TRANSPLANT RECIPIENT: ICD-10-CM

## 2024-06-11 DIAGNOSIS — I51.7 LVH (LEFT VENTRICULAR HYPERTROPHY): Chronic | ICD-10-CM

## 2024-06-11 DIAGNOSIS — I10 ESSENTIAL HYPERTENSION, MALIGNANT: Chronic | ICD-10-CM

## 2024-06-11 DIAGNOSIS — I50.32 CHRONIC DIASTOLIC HEART FAILURE (HCC): Chronic | ICD-10-CM

## 2024-06-11 RX ORDER — AMLODIPINE BESYLATE 5 MG/1
5 TABLET ORAL DAILY
Qty: 90 TABLET | Refills: 3 | Status: SHIPPED | OUTPATIENT
Start: 2024-06-11

## 2024-09-21 DIAGNOSIS — I10 ESSENTIAL HYPERTENSION, MALIGNANT: ICD-10-CM

## 2024-09-21 DIAGNOSIS — I50.32 CHRONIC DIASTOLIC HEART FAILURE (HCC): ICD-10-CM

## 2024-09-21 DIAGNOSIS — I51.7 LVH (LEFT VENTRICULAR HYPERTROPHY): ICD-10-CM

## 2024-09-21 DIAGNOSIS — Z94.0 KIDNEY TRANSPLANT RECIPIENT: ICD-10-CM

## 2024-09-24 RX ORDER — ATORVASTATIN CALCIUM 40 MG/1
40 TABLET, FILM COATED ORAL DAILY
Qty: 90 TABLET | Refills: 3 | Status: SHIPPED | OUTPATIENT
Start: 2024-09-24

## 2024-09-25 RX ORDER — ATORVASTATIN CALCIUM 40 MG/1
40 TABLET, FILM COATED ORAL DAILY
Qty: 90 TABLET | Refills: 3 | OUTPATIENT
Start: 2024-09-25

## 2024-12-24 DIAGNOSIS — I51.7 LVH (LEFT VENTRICULAR HYPERTROPHY): Chronic | ICD-10-CM

## 2024-12-24 DIAGNOSIS — I50.32 CHRONIC DIASTOLIC HEART FAILURE (HCC): Chronic | ICD-10-CM

## 2024-12-24 DIAGNOSIS — Z94.0 KIDNEY TRANSPLANT RECIPIENT: ICD-10-CM

## 2024-12-24 DIAGNOSIS — I10 ESSENTIAL HYPERTENSION, MALIGNANT: Chronic | ICD-10-CM

## 2024-12-24 RX ORDER — METOPROLOL TARTRATE 50 MG
50 TABLET ORAL 2 TIMES DAILY
Qty: 180 TABLET | Refills: 3 | Status: SHIPPED | OUTPATIENT
Start: 2024-12-24

## 2024-12-24 NOTE — TELEPHONE ENCOUNTER
Last ov:24 MACIEJ  Next ov:2025 MACIEJ  Last EK22  Last labs: [facility] 09/10/2024  Last filled:   Disp Refills Start End    metoprolol tartrate (LOPRESSOR) 50 MG tablet 180 tablet 3 2024 --    Sig - Route: Take 1 tablet by mouth 2 times daily - Oral    Sent to pharmacy as: Metoprolol Tartrate 50 MG Oral Tablet (LOPRESSOR)    E-Prescribing Status: Receipt confirmed by pharmacy (2024 12:19 PM EST)

## 2025-04-21 NOTE — PROGRESS NOTES
[facility]   Advanced Heart Failure/Pulmonary Hypertension  Cardiac Evaluation      Leo Coon  YOB: 1945    Date of Visit:  5/5/25     Chief Complaint   Patient presents with    Congestive Heart Failure      History of Present Illness:  Mr. Leo Coon is a 79 y.o. gentleman with a history of his carotid disease, CHF, hyperlipidemia, hypertension, CKD.  History of cardiac catheterization at  in 2007 with normal coronaries. He underwent a kidney transplant in December 2014 up at [facility]; he followed up in [place] for years but is now with Dr. Strong at  [facility].     On 10/5/18, he reported that he had a biopsy and was diagnosed with prostate cancer. He is followed by radiation doctor at  they are monitoring. No treatment at this time.      He lost his brother Tanya's Day 2020 from lung disease. He had a bout of shingles before last visit 7/31/2020.    Last echo 4/7/23 showed EF 55%. Blood pressure has been well controlled and labs per nephrology every 3 months. Continues Neoral and transplant was 8.5 years ago. His cholesterol was not well controlled while taking Lipitor 20 mg daily. He reported leg pain on 80mg but agreeable to increase Lipitor to 40 mg daily.  Cyclosporine level is between 300 and 450 8.5 years after transplant and creatinine is increasing.  He follows with primary care provider Cierra Soni at  and recently treated for Gout of his left hand.     Standing Orders: no  Cardiac Stent: no  Cardiac Device: no  LOV Plan: no changes  Since Last Visit: No visits    Pt was last seen in office 4/26/2024 and presents today for a follow up of chronic diastolic heart failure. Last EF 55% on 4/7/23. Echo today, EF 55-60%, severely increased wall thickness, RVSP 26mmHg. He had a kidney transplant 11 years ago and follows with . ED visit 3/20/25: Epiploic appendagitis, discharged same day. Treated with antibiotics and Tylenol, he is better now. Nephrologist is

## 2025-05-05 ENCOUNTER — HOSPITAL ENCOUNTER (OUTPATIENT)
Age: 80
Discharge: HOME | End: 2025-05-07
Payer: MEDICARE

## 2025-05-05 ENCOUNTER — OFFICE VISIT (OUTPATIENT)
Dept: CARDIOLOGY CLINIC | Age: 80
End: 2025-05-05
Payer: MEDICARE

## 2025-05-05 VITALS
WEIGHT: 184 LBS | SYSTOLIC BLOOD PRESSURE: 135 MMHG | HEIGHT: 69 IN | BODY MASS INDEX: 27.25 KG/M2 | DIASTOLIC BLOOD PRESSURE: 81 MMHG

## 2025-05-05 VITALS
DIASTOLIC BLOOD PRESSURE: 66 MMHG | HEIGHT: 69 IN | SYSTOLIC BLOOD PRESSURE: 122 MMHG | WEIGHT: 192 LBS | HEART RATE: 61 BPM | BODY MASS INDEX: 28.44 KG/M2 | OXYGEN SATURATION: 97 %

## 2025-05-05 DIAGNOSIS — I50.32 CHRONIC DIASTOLIC HEART FAILURE (HCC): Primary | ICD-10-CM

## 2025-05-05 DIAGNOSIS — I51.7 LVH (LEFT VENTRICULAR HYPERTROPHY): ICD-10-CM

## 2025-05-05 DIAGNOSIS — Z94.0 KIDNEY TRANSPLANT RECIPIENT: ICD-10-CM

## 2025-05-05 DIAGNOSIS — Z94.0 KIDNEY TRANSPLANT RECIPIENT: Primary | ICD-10-CM

## 2025-05-05 DIAGNOSIS — I10 ESSENTIAL HYPERTENSION, MALIGNANT: ICD-10-CM

## 2025-05-05 DIAGNOSIS — R06.02 SOB (SHORTNESS OF BREATH): ICD-10-CM

## 2025-05-05 DIAGNOSIS — I51.7 LVH (LEFT VENTRICULAR HYPERTROPHY): Chronic | ICD-10-CM

## 2025-05-05 LAB
ECHO AO ASC DIAM: 3.5 CM
ECHO AO ASCENDING AORTA INDEX: 1.76 CM/M2
ECHO AO ROOT DIAM: 2.8 CM
ECHO AO ROOT INDEX: 1.41 CM/M2
ECHO AV PEAK GRADIENT: 12 MMHG
ECHO AV PEAK VELOCITY: 1.8 M/S
ECHO BSA: 2.02 M2
ECHO EST RA PRESSURE: 3 MMHG
ECHO IVC PROX: 1.9 CM
ECHO LA AREA 2C: 23.1 CM2
ECHO LA AREA 4C: 21.9 CM2
ECHO LA DIAMETER INDEX: 1.61 CM/M2
ECHO LA DIAMETER: 3.2 CM
ECHO LA MAJOR AXIS: 6.3 CM
ECHO LA MINOR AXIS: 5.9 CM
ECHO LA TO AORTIC ROOT RATIO: 1.14
ECHO LA VOL BP: 68 ML (ref 18–58)
ECHO LA VOL MOD A2C: 73 ML (ref 18–58)
ECHO LA VOL MOD A4C: 58 ML (ref 18–58)
ECHO LA VOL/BSA BIPLANE: 34 ML/M2 (ref 16–34)
ECHO LA VOLUME INDEX MOD A2C: 37 ML/M2 (ref 16–34)
ECHO LA VOLUME INDEX MOD A4C: 29 ML/M2 (ref 16–34)
ECHO LV E' LATERAL VELOCITY: 6.24 CM/S
ECHO LV E' SEPTAL VELOCITY: 5.7 CM/S
ECHO LV EDV A2C: 60 ML
ECHO LV EDV A4C: 69 ML
ECHO LV EDV INDEX A4C: 35 ML/M2
ECHO LV EDV NDEX A2C: 30 ML/M2
ECHO LV EJECTION FRACTION 3D: 60 %
ECHO LV EJECTION FRACTION A2C: 57 %
ECHO LV EJECTION FRACTION A4C: 56 %
ECHO LV EJECTION FRACTION BIPLANE: 56 % (ref 55–100)
ECHO LV ESV A2C: 26 ML
ECHO LV ESV A4C: 30 ML
ECHO LV ESV INDEX A2C: 13 ML/M2
ECHO LV ESV INDEX A4C: 15 ML/M2
ECHO LV FRACTIONAL SHORTENING: 37 % (ref 28–44)
ECHO LV GLOBAL LONGITUDINAL STRAIN (GLS): -18 %
ECHO LV INTERNAL DIMENSION DIASTOLE INDEX: 1.91 CM/M2
ECHO LV INTERNAL DIMENSION DIASTOLIC: 3.8 CM (ref 4.2–5.9)
ECHO LV INTERNAL DIMENSION SYSTOLIC INDEX: 1.21 CM/M2
ECHO LV INTERNAL DIMENSION SYSTOLIC: 2.4 CM
ECHO LV IVSD: 1.8 CM (ref 0.6–1)
ECHO LV MASS 2D: 292.1 G (ref 88–224)
ECHO LV MASS INDEX 2D: 146.8 G/M2 (ref 49–115)
ECHO LV POSTERIOR WALL DIASTOLIC: 1.8 CM (ref 0.6–1)
ECHO LV RELATIVE WALL THICKNESS RATIO: 0.95
ECHO MV A VELOCITY: 0.94 M/S
ECHO MV E DECELERATION TIME (DT): 248 MS
ECHO MV E VELOCITY: 0.61 M/S
ECHO MV E/A RATIO: 0.65
ECHO MV E/E' LATERAL: 9.78
ECHO MV E/E' RATIO (AVERAGED): 10.24
ECHO MV E/E' SEPTAL: 10.7
ECHO PULMONARY ARTERY END DIASTOLIC PRESSURE: 6 MMHG
ECHO PV MAX VELOCITY: 1.2 M/S
ECHO PV PEAK GRADIENT: 5 MMHG
ECHO PV REGURGITANT MAX VELOCITY: 1.3 M/S
ECHO RA AREA 4C: 15.1 CM2
ECHO RA END SYSTOLIC VOLUME APICAL 4 CHAMBER INDEX BSA: 17 ML/M2
ECHO RA VOLUME: 33 ML
ECHO RIGHT VENTRICULAR SYSTOLIC PRESSURE (RVSP): 26 MMHG
ECHO RV BASAL DIMENSION: 4.2 CM
ECHO RV FREE WALL PEAK S': 15.8 CM/S
ECHO RV TAPSE: 2.8 CM (ref 1.7–?)
ECHO TV REGURGITANT MAX VELOCITY: 2.4 M/S
ECHO TV REGURGITANT PEAK GRADIENT: 23 MMHG

## 2025-05-05 PROCEDURE — G8419 CALC BMI OUT NRM PARAM NOF/U: HCPCS | Performed by: INTERNAL MEDICINE

## 2025-05-05 PROCEDURE — 93306 TTE W/DOPPLER COMPLETE: CPT | Performed by: INTERNAL MEDICINE

## 2025-05-05 PROCEDURE — 1123F ACP DISCUSS/DSCN MKR DOCD: CPT | Performed by: INTERNAL MEDICINE

## 2025-05-05 PROCEDURE — 1036F TOBACCO NON-USER: CPT | Performed by: INTERNAL MEDICINE

## 2025-05-05 PROCEDURE — G2211 COMPLEX E/M VISIT ADD ON: HCPCS | Performed by: INTERNAL MEDICINE

## 2025-05-05 PROCEDURE — 93356 MYOCRD STRAIN IMG SPCKL TRCK: CPT | Performed by: INTERNAL MEDICINE

## 2025-05-05 PROCEDURE — 1159F MED LIST DOCD IN RCRD: CPT | Performed by: INTERNAL MEDICINE

## 2025-05-05 PROCEDURE — 93306 TTE W/DOPPLER COMPLETE: CPT

## 2025-05-05 PROCEDURE — 99215 OFFICE O/P EST HI 40 MIN: CPT | Performed by: INTERNAL MEDICINE

## 2025-05-05 PROCEDURE — 3074F SYST BP LT 130 MM HG: CPT | Performed by: INTERNAL MEDICINE

## 2025-05-05 PROCEDURE — 3078F DIAST BP <80 MM HG: CPT | Performed by: INTERNAL MEDICINE

## 2025-05-05 PROCEDURE — G8427 DOCREV CUR MEDS BY ELIG CLIN: HCPCS | Performed by: INTERNAL MEDICINE

## 2025-05-05 RX ORDER — NITROGLYCERIN 0.4 MG/1
0.4 TABLET SUBLINGUAL PRN
Qty: 25 TABLET | Refills: 3 | Status: SHIPPED | OUTPATIENT
Start: 2025-05-05

## 2025-05-05 NOTE — PATIENT INSTRUCTIONS
Continue current medications, no changes  Routine now: amyloid labs  Follow up with Katharine Howard MD in 1 year  Call my office for any worsening symptoms such as shortness of breath, chest pain, sudden weight gain or loss, or any increased swelling: Phone: [phone] or Fax: [phone]

## 2025-05-15 ENCOUNTER — RESULTS FOLLOW-UP (OUTPATIENT)
Dept: CARDIOLOGY CLINIC | Age: 80
End: 2025-05-15

## 2025-05-21 LAB
KAPPA: 42.4 MG/L (ref 3.3–19.4)
LAMBDA: 26.1 MG/L (ref 5.7–26.3)

## 2025-05-23 LAB
ALPHA-1-GLOBULIN: 3.2 MG/DL
ALPHA-2-GLOBULIN: 1.1 MG/DL
BETA GLOBULIN: 0.5 MG/DL
GAMMA GLOBULIN: 3.3 MG/DL
INTERPRETATION: NORMAL
Lab: NO
M SPIKE: 0 MG/DL
MICROALBUMIN/CREAT 24H UR: 8.9 MG/DL
PROTEIN, URINE, RANDOM: 17 MG/DL

## 2025-05-26 DIAGNOSIS — I51.7 LVH (LEFT VENTRICULAR HYPERTROPHY): Chronic | ICD-10-CM

## 2025-05-26 DIAGNOSIS — I10 ESSENTIAL HYPERTENSION, MALIGNANT: Chronic | ICD-10-CM

## 2025-05-26 DIAGNOSIS — I50.32 CHRONIC DIASTOLIC HEART FAILURE (HCC): Chronic | ICD-10-CM

## 2025-05-26 DIAGNOSIS — Z94.0 KIDNEY TRANSPLANT RECIPIENT: ICD-10-CM

## 2025-05-27 ENCOUNTER — RESULTS FOLLOW-UP (OUTPATIENT)
Dept: CARDIOLOGY CLINIC | Age: 80
End: 2025-05-27

## 2025-05-27 DIAGNOSIS — R06.02 SOB (SHORTNESS OF BREATH): ICD-10-CM

## 2025-05-27 DIAGNOSIS — I51.7 LVH (LEFT VENTRICULAR HYPERTROPHY): ICD-10-CM

## 2025-05-27 DIAGNOSIS — I50.32 CHRONIC DIASTOLIC HEART FAILURE (HCC): Primary | ICD-10-CM

## 2025-05-27 RX ORDER — FUROSEMIDE 40 MG/1
20 TABLET ORAL 2 TIMES DAILY
Qty: 90 TABLET | Refills: 3 | Status: SHIPPED | OUTPATIENT
Start: 2025-05-27

## 2025-05-27 NOTE — TELEPHONE ENCOUNTER
Prescription refill:  Requested Prescriptions     Pending Prescriptions Disp Refills    furosemide (LASIX) 40 MG tablet 90 tablet 3     Sig: Take 0.5 tablets by mouth 2 times daily       Refill parameters per protocol were met    Last OV: 5/5/2025     Future Appt: Visit date not found     LABS:  3/20/25 in [facility]

## 2025-05-28 NOTE — TELEPHONE ENCOUNTER
----- Message from Dr. Katharine Howard MD sent at 5/27/2025  5:23 PM EDT -----  Please call the patient and let him know that the labs do not show one type of amyloidosis.  This is good news.  I want him to get a special nuclear scan that we call PYP nuclear test to rule out the other type of amyloidosis.  The office can help him get this scheduled at [place] (do not schedule at [facility]).  MACIEJ

## 2025-05-29 NOTE — TELEPHONE ENCOUNTER
Called pt and relayed LEWs message. PYP ordered.      Dr. Howard would like for you to have a PYP scan, (technetium-99m pyrophosphate scintigraphy) a nuclear medicine scan that uses a Tracer/Isotope to help diagnose cardiac amyloidosis.  You will have an IV in your arm.      The Nuclear Tech will give you the IV Tracer/ Isotope and tell you when to return to the hospital for the Scanning portion.   The test is about 3 hours long. You can eat and drink before the testing. You can take all medications before the Testing.      Testing will be at [facility]. Dottie will call you to schedule the PYP test or you can contact central scheduling at [address] [place] [address] or [phone]       We will be in contact with you by telephone to give you the results after the testing is complete.

## 2025-05-30 LAB
Lab: NORMAL
PERFORMING LAB: NORMAL
TEST NAME: 1685

## 2025-06-03 ENCOUNTER — HOSPITAL ENCOUNTER (OUTPATIENT)
Dept: NUCLEAR MEDICINE | Age: 80
Discharge: HOME | End: 2025-06-03
Attending: INTERNAL MEDICINE
Payer: MEDICARE

## 2025-06-03 DIAGNOSIS — I51.7 LVH (LEFT VENTRICULAR HYPERTROPHY): ICD-10-CM

## 2025-06-03 DIAGNOSIS — R06.02 SOB (SHORTNESS OF BREATH): ICD-10-CM

## 2025-06-03 DIAGNOSIS — I50.32 CHRONIC DIASTOLIC HEART FAILURE (HCC): ICD-10-CM

## 2025-06-03 PROCEDURE — 3430000000 HC RX DIAGNOSTIC RADIOPHARMACEUTICAL: Performed by: INTERNAL MEDICINE

## 2025-06-03 PROCEDURE — 78803 RP LOCLZJ TUM SPECT 1 AREA: CPT

## 2025-06-03 PROCEDURE — A9561 TC99M OXIDRONATE: HCPCS | Performed by: INTERNAL MEDICINE

## 2025-06-03 RX ADMIN — TECHNETIUM TC 99M OXIDRONATE 21.37 MILLICURIE: 3.15 INJECTION, POWDER, LYOPHILIZED, FOR SOLUTION INTRAVENOUS at 09:57

## 2025-06-04 ENCOUNTER — RESULTS FOLLOW-UP (OUTPATIENT)
Dept: CARDIOLOGY CLINIC | Age: 80
End: 2025-06-04

## 2025-06-16 DIAGNOSIS — I51.7 LVH (LEFT VENTRICULAR HYPERTROPHY): Chronic | ICD-10-CM

## 2025-06-16 DIAGNOSIS — I50.32 CHRONIC DIASTOLIC HEART FAILURE (HCC): Chronic | ICD-10-CM

## 2025-06-16 DIAGNOSIS — Z94.0 KIDNEY TRANSPLANT RECIPIENT: ICD-10-CM

## 2025-06-16 DIAGNOSIS — I10 ESSENTIAL HYPERTENSION, MALIGNANT: Chronic | ICD-10-CM

## 2025-06-16 RX ORDER — AMLODIPINE BESYLATE 5 MG/1
5 TABLET ORAL DAILY
Qty: 90 TABLET | Refills: 3 | Status: SHIPPED | OUTPATIENT
Start: 2025-06-16

## 2025-06-16 NOTE — TELEPHONE ENCOUNTER
Prescription refill:  Requested Prescriptions     Pending Prescriptions Disp Refills    amLODIPine (NORVASC) 5 MG tablet 90 tablet 3     Sig: Take 1 tablet by mouth daily       Refill parameters per protocol were met    Last OV: 5/5/2025     Future Appt: Visit date not found     LABS: 5/20/25

## 2025-06-16 NOTE — TELEPHONE ENCOUNTER
Prescription refill:  Requested Prescriptions     Pending Prescriptions Disp Refills    amLODIPine (NORVASC) 5 MG tablet [Pharmacy Med Name: amLODIPine BESYLATE 5 MG TAB] 90 tablet 3     Sig: TAKE 1 TABLET BY MOUTH DAILY       Refill parameters per protocol were met    Last OV: 5/5/2025     Future Appt: 6/16/2025     LABS: